# Patient Record
Sex: FEMALE | Race: WHITE | NOT HISPANIC OR LATINO | Employment: FULL TIME | URBAN - METROPOLITAN AREA
[De-identification: names, ages, dates, MRNs, and addresses within clinical notes are randomized per-mention and may not be internally consistent; named-entity substitution may affect disease eponyms.]

---

## 2017-03-10 ENCOUNTER — HOSPITAL ENCOUNTER (EMERGENCY)
Facility: CLINIC | Age: 57
Discharge: HOME | End: 2017-03-10
Attending: EMERGENCY MEDICINE | Admitting: EMERGENCY MEDICINE
Payer: COMMERCIAL

## 2017-03-10 VITALS
WEIGHT: 146 LBS | SYSTOLIC BLOOD PRESSURE: 128 MMHG | BODY MASS INDEX: 23.46 KG/M2 | DIASTOLIC BLOOD PRESSURE: 85 MMHG | RESPIRATION RATE: 15 BRPM | OXYGEN SATURATION: 96 % | HEIGHT: 66 IN | TEMPERATURE: 98.3 F

## 2017-03-10 DIAGNOSIS — R00.2 PALPITATIONS: ICD-10-CM

## 2017-03-10 LAB
ANION GAP SERPL CALCULATED.3IONS-SCNC: 7 MMOL/L (ref 3–14)
BASOPHILS # BLD AUTO: 0 10E9/L (ref 0–0.2)
BASOPHILS NFR BLD AUTO: 0.3 %
BUN SERPL-MCNC: 18 MG/DL (ref 7–30)
CALCIUM SERPL-MCNC: 8.9 MG/DL (ref 8.5–10.1)
CHLORIDE SERPL-SCNC: 103 MMOL/L (ref 94–109)
CO2 SERPL-SCNC: 31 MMOL/L (ref 20–32)
CREAT SERPL-MCNC: 0.9 MG/DL (ref 0.52–1.04)
DIFFERENTIAL METHOD BLD: NORMAL
EOSINOPHIL # BLD AUTO: 0.1 10E9/L (ref 0–0.7)
EOSINOPHIL NFR BLD AUTO: 1.1 %
ERYTHROCYTE [DISTWIDTH] IN BLOOD BY AUTOMATED COUNT: 12 % (ref 10–15)
GFR SERPL CREATININE-BSD FRML MDRD: 64 ML/MIN/1.7M2
GLUCOSE SERPL-MCNC: 103 MG/DL (ref 70–99)
HCT VFR BLD AUTO: 41.5 % (ref 35–47)
HGB BLD-MCNC: 14.2 G/DL (ref 11.7–15.7)
IMM GRANULOCYTES # BLD: 0 10E9/L (ref 0–0.4)
IMM GRANULOCYTES NFR BLD: 0.2 %
INTERPRETATION ECG - MUSE: NORMAL
LYMPHOCYTES # BLD AUTO: 2.2 10E9/L (ref 0.8–5.3)
LYMPHOCYTES NFR BLD AUTO: 35.8 %
MCH RBC QN AUTO: 30.9 PG (ref 26.5–33)
MCHC RBC AUTO-ENTMCNC: 34.2 G/DL (ref 31.5–36.5)
MCV RBC AUTO: 90 FL (ref 78–100)
MONOCYTES # BLD AUTO: 0.3 10E9/L (ref 0–1.3)
MONOCYTES NFR BLD AUTO: 4.3 %
NEUTROPHILS # BLD AUTO: 3.6 10E9/L (ref 1.6–8.3)
NEUTROPHILS NFR BLD AUTO: 58.3 %
NRBC # BLD AUTO: 0 10*3/UL
NRBC BLD AUTO-RTO: 0 /100
PLATELET # BLD AUTO: 234 10E9/L (ref 150–450)
POTASSIUM SERPL-SCNC: 3.6 MMOL/L (ref 3.4–5.3)
RBC # BLD AUTO: 4.59 10E12/L (ref 3.8–5.2)
SODIUM SERPL-SCNC: 141 MMOL/L (ref 133–144)
T4 FREE SERPL-MCNC: 1.01 NG/DL (ref 0.76–1.46)
TROPONIN I SERPL-MCNC: 0.04 UG/L (ref 0–0.04)
TSH SERPL DL<=0.005 MIU/L-ACNC: 5.76 MU/L (ref 0.4–4)
WBC # BLD AUTO: 6.1 10E9/L (ref 4–11)

## 2017-03-10 PROCEDURE — 84484 ASSAY OF TROPONIN QUANT: CPT | Performed by: EMERGENCY MEDICINE

## 2017-03-10 PROCEDURE — 85025 COMPLETE CBC W/AUTO DIFF WBC: CPT | Performed by: EMERGENCY MEDICINE

## 2017-03-10 PROCEDURE — 80048 BASIC METABOLIC PNL TOTAL CA: CPT | Performed by: EMERGENCY MEDICINE

## 2017-03-10 PROCEDURE — 84439 ASSAY OF FREE THYROXINE: CPT | Performed by: EMERGENCY MEDICINE

## 2017-03-10 PROCEDURE — 93005 ELECTROCARDIOGRAM TRACING: CPT

## 2017-03-10 PROCEDURE — 84443 ASSAY THYROID STIM HORMONE: CPT | Performed by: EMERGENCY MEDICINE

## 2017-03-10 PROCEDURE — 99284 EMERGENCY DEPT VISIT MOD MDM: CPT

## 2017-03-10 ASSESSMENT — ENCOUNTER SYMPTOMS
COUGH: 0
SHORTNESS OF BREATH: 0
PALPITATIONS: 1

## 2017-03-10 NOTE — ED AVS SNAPSHOT
Emergency Department    [address] [facility] [address]    Phone:  [phone]    Fax:  [phone]                                       Loretta Pizarro   MRN: [id number]    Department:   Emergency Department   Date of Visit:  3/10/2017           After Visit Summary Signature Page     I have received my discharge instructions, and my questions have been answered. I have discussed any challenges I see with this plan with the nurse or doctor.    ..........................................................................................................................................  Patient/Patient Representative Signature      ..........................................................................................................................................  Patient Representative Print Name and Relationship to Patient    ..................................................               ................................................  Date                                            Time    ..........................................................................................................................................  Reviewed by Signature/Title    ...................................................              ..............................................  Date                                                            Time

## 2017-03-10 NOTE — ED AVS SNAPSHOT
Emergency Department    [address] [facility] [address]    Phone:  [phone]    Fax:  [phone]                                       Loretta Pizarro   MRN: [id number]    Department:   Emergency Department   Date of Visit:  3/10/2017           Patient Information     Date Of Birth          1960        Your diagnoses for this visit were:     Palpitations        You were seen by Emile Zelaya MD.      Follow-up Information     Follow up with Clinic, [facility] In 3 days.    Why:  for recheck to discuss thyroid function tests, and holter monitor    Contact information:    [address] [facility], [address]  [facility] [address]  [phone]          Discharge Instructions         Discharge Instructions  Palpitations    Palpitations are an unusual awareness of your heartbeat. People often describe this as the heart skipping, fluttering, racing, irregular, or pounding. At this time, your doctor has found no signs that your palpitations are due to a serious or life-threatening condition. However, sometimes there is a serious problem that does not show up right away. It is important that you follow up with your doctor within 1 week, or as directed by your doctor today, to check for other serious problems. You may need more blood tests, a stress test, heart monitoring, or other tests.    Palpitations can be caused by caffeine, cigarettes, diet pills, energy drinks or supplements, other stimulants, and medications and street drugs. They can also be caused by anxiety, hormone conditions such as high thyroid, and other medical conditions. Sometimes they are a sign of abnormal rhythm in the heart, so you may need your heart checked.    Return to the Emergency Department if:    You get chest pain or tightness.    You are short of breath.    You get very weak or tired.    You pass out or faint.    Your heart rate is over 120 beats per minute for more than 10  minutes while you are resting.    You have any new symptoms, like fever, cough, numb legs, or you cough up blood.    You have anything else that worries you.    What can I do to help myself?    Fill any prescriptions the doctor gave you and take them right away.     Follow your doctor s instructions about the prescription medicines you are on. Sometimes the doctor may tell you to stop taking a medicine or change the dose.    If you smoke, this may be a good time to quit! The less you can smoke, the better.    Do not use energy drinks, diet pills, or stimulants. Limit your use of caffeine.    Follow up with your doctor:    Within 1 week, or sooner if instructed.    If you keep having palpitations.    If you need help to quit smoking.  If you were given a prescription for medicine here today, be sure to read all of the information (including the package insert) that comes with your prescription.  This will include important information about the medicine, its side effects, and any warnings that you need to know about.  The pharmacist who fills the prescription can provide more information and answer questions you may have about the medicine.  If you have questions or concerns that the pharmacist cannot address, please call or return to the Emergency Department.         Opioid Medication Information    Pain medications are among the most commonly prescribed medicines, so we are including this information for all our patients. If you did not receive pain medication or get a prescription for pain medicine, you can ignore it.     You may have been given a prescription for an opioid (narcotic) pain medicine and/or have received a pain medicine while here in the Emergency Department. These medicines can make you drowsy or impaired. You must not drive, operate dangerous equipment, or engage in any other dangerous activities while taking these medications. If you drive while taking these medications, you could be arrested for  DUI, or driving under the influence. Do not drink any alcohol while you are taking these medications.     Opioid pain medications can cause addiction. If you have a history of chemical dependency of any type, you are at a higher risk of becoming addicted to pain medications.  Only take these prescribed medications to treat your pain when all other options have been tried. Take it for as short a time and as few doses as possible. Store your pain pills in a secure place, as they are frequently stolen and provide a dangerous opportunity for children or visitors in your house to start abusing these powerful medications. We will not replace any lost or stolen medicine.  As soon as your pain is better, you should flush all your remaining medication.     Many prescription pain medications contain Tylenol  (acetaminophen), including Vicodin , Tylenol #3 , Norco , Lortab , and Percocet .  You should not take any extra pills of Tylenol  if you are using these prescription medications or you can get very sick.  Do not ever take more than 3000 mg of acetaminophen in any 24 hour period.    All opioids tend to cause constipation. Drink plenty of water and eat foods that have a lot of fiber, such as fruits, vegetables, prune juice, apple juice and high fiber cereal.  Take a laxative if you don t move your bowels at least every other day. Miralax , Milk of Magnesia, Colace , or Senna  can be used to keep you regular.      Remember that you can always come back to the Emergency Department if you are not able to see your regular doctor in the amount of time listed above, if you get any new symptoms, or if there is anything that worries you.        24 Hour Appointment Hotline       To make an appointment at any [facility], call [phone] ([phone]). If you don't have a family doctor or clinic, we will help you find one. [facility] are conveniently located to serve the needs of you and your family.              Review of your medicines      Notice     You have not been prescribed any medications.            Procedures and tests performed during your visit     Basic metabolic panel    CBC with platelets + differential    EKG 12 lead    T4 free    TSH with free T4 reflex    Troponin I (now)      Orders Needing Specimen Collection     None      Pending Results     No orders found from 3/8/2017 to 3/11/2017.            Pending Culture Results     No orders found from 3/8/2017 to 3/11/2017.             Test Results from your hospital stay     3/10/2017 10:31 PM - Interface, Flexilab Results      Component Results     Component Value Ref Range & Units Status    WBC 6.1 4.0 - 11.0 10e9/L Final    RBC Count 4.59 3.8 - 5.2 10e12/L Final    Hemoglobin 14.2 11.7 - 15.7 g/dL Final    Hematocrit 41.5 35.0 - 47.0 % Final    MCV 90 78 - 100 fl Final    MCH 30.9 26.5 - 33.0 pg Final    MCHC 34.2 31.5 - 36.5 g/dL Final    RDW 12.0 10.0 - 15.0 % Final    Platelet Count 234 150 - 450 10e9/L Final    Diff Method Automated Method  Final    % Neutrophils 58.3 % Final    % Lymphocytes 35.8 % Final    % Monocytes 4.3 % Final    % Eosinophils 1.1 % Final    % Basophils 0.3 % Final    % Immature Granulocytes 0.2 % Final    Nucleated RBCs 0 0 /100 Final    Absolute Neutrophil 3.6 1.6 - 8.3 10e9/L Final    Absolute Lymphocytes 2.2 0.8 - 5.3 10e9/L Final    Absolute Monocytes 0.3 0.0 - 1.3 10e9/L Final    Absolute Eosinophils 0.1 0.0 - 0.7 10e9/L Final    Absolute Basophils 0.0 0.0 - 0.2 10e9/L Final    Abs Immature Granulocytes 0.0 0 - 0.4 10e9/L Final    Absolute Nucleated RBC 0.0  Final         3/10/2017 10:43 PM - Interface, Flexilab Results      Component Results     Component Value Ref Range & Units Status    Sodium 141 133 - 144 mmol/L Final    Potassium 3.6 3.4 - 5.3 mmol/L Final    Chloride 103 94 - 109 mmol/L Final    Carbon Dioxide 31 20 - 32 mmol/L Final    Anion Gap 7 3 - 14 mmol/L Final    Glucose 103 (H) 70 - 99 mg/dL Final    Urea  Nitrogen 18 7 - 30 mg/dL Final    Creatinine 0.90 0.52 - 1.04 mg/dL Final    GFR Estimate 64 >60 mL/min/1.7m2 Final    Non  GFR Calc    GFR Estimate If Black 78 >60 mL/min/1.7m2 Final    African American GFR Calc    Calcium 8.9 8.5 - 10.1 mg/dL Final         3/10/2017 10:53 PM - Interface, Flexilab Results      Component Results     Component Value Ref Range & Units Status    TSH 5.76 (H) 0.40 - 4.00 mU/L Final         3/10/2017 10:48 PM - Interface, Flexilab Results      Component Results     Component Value Ref Range & Units Status    Troponin I ES 0.045 0.000 - 0.045 ug/L Final    The 99th percentile for upper reference range is 0.045 ug/L.  Troponin values   in   the range of 0.045 - 0.120 ug/L may be associated with risks of adverse   clinical events.           3/10/2017 11:06 PM - Interface, Flexilab Results      Component Results     Component Value Ref Range & Units Status    T4 Free 1.01 0.76 - 1.46 ng/dL Final                Clinical Quality Measure: Blood Pressure Screening     Your blood pressure was checked while you were in the emergency department today. The last reading we obtained was  BP: 128/85 . Please read the guidelines below about what these numbers mean and what you should do about them.  If your systolic blood pressure (the top number) is less than 120 and your diastolic blood pressure (the bottom number) is less than 80, then your blood pressure is normal. There is nothing more that you need to do about it.  If your systolic blood pressure (the top number) is 120-139 or your diastolic blood pressure (the bottom number) is 80-89, your blood pressure may be higher than it should be. You should have your blood pressure rechecked within a year by a primary care provider.  If your systolic blood pressure (the top number) is 140 or greater or your diastolic blood pressure (the bottom number) is 90 or greater, you may have high blood pressure. High blood pressure is treatable, but  "if left untreated over time it can put you at risk for heart attack, stroke, or kidney failure. You should have your blood pressure rechecked by a primary care provider within the next 4 weeks.  If your provider in the emergency department today gave you specific instructions to follow-up with your doctor or provider even sooner than that, you should follow that instruction and not wait for up to 4 weeks for your follow-up visit.        Thank you for choosing [facility]       Thank you for choosing [facility] for your care. Our goal is always to provide you with excellent care. Hearing back from our patients is one way we can continue to improve our services. Please take a few minutes to complete the written survey that you may receive in the mail after you visit with us. Thank you!        [facility] Information     [facility] lets you send messages to your doctor, view your test results, renew your prescriptions, schedule appointments and more. To sign up, go to [email or web address] . Click on \"Log in\" on the left side of the screen, which will take you to the Welcome page. Then click on \"Sign up Now\" on the right side of the page.     You will be asked to enter the access code listed below, as well as some personal information. Please follow the directions to create your username and password.     Your access code is: [email or web address]  Expires: 2017 11:09 PM     Your access code will  in 90 days. If you need help or a new code, please call your [facility] clinic or [phone].        Care EveryWhere ID     This is your Care EveryWhere ID. This could be used by other organizations to access your [facility] medical records  [id number]        After Visit Summary       This is your record. Keep this with you and show to your community pharmacist(s) and doctor(s) at your next visit.                  "

## 2017-03-11 NOTE — DISCHARGE INSTRUCTIONS
Discharge Instructions  Palpitations    Palpitations are an unusual awareness of your heartbeat. People often describe this as the heart skipping, fluttering, racing, irregular, or pounding. At this time, your doctor has found no signs that your palpitations are due to a serious or life-threatening condition. However, sometimes there is a serious problem that does not show up right away. It is important that you follow up with your doctor within 1 week, or as directed by your doctor today, to check for other serious problems. You may need more blood tests, a stress test, heart monitoring, or other tests.    Palpitations can be caused by caffeine, cigarettes, diet pills, energy drinks or supplements, other stimulants, and medications and street drugs. They can also be caused by anxiety, hormone conditions such as high thyroid, and other medical conditions. Sometimes they are a sign of abnormal rhythm in the heart, so you may need your heart checked.    Return to the Emergency Department if:    You get chest pain or tightness.    You are short of breath.    You get very weak or tired.    You pass out or faint.    Your heart rate is over 120 beats per minute for more than 10 minutes while you are resting.    You have any new symptoms, like fever, cough, numb legs, or you cough up blood.    You have anything else that worries you.    What can I do to help myself?    Fill any prescriptions the doctor gave you and take them right away.     Follow your doctor s instructions about the prescription medicines you are on. Sometimes the doctor may tell you to stop taking a medicine or change the dose.    If you smoke, this may be a good time to quit! The less you can smoke, the better.    Do not use energy drinks, diet pills, or stimulants. Limit your use of caffeine.    Follow up with your doctor:    Within 1 week, or sooner if instructed.    If you keep having palpitations.    If you need help to quit smoking.  If you were  given a prescription for medicine here today, be sure to read all of the information (including the package insert) that comes with your prescription.  This will include important information about the medicine, its side effects, and any warnings that you need to know about.  The pharmacist who fills the prescription can provide more information and answer questions you may have about the medicine.  If you have questions or concerns that the pharmacist cannot address, please call or return to the Emergency Department.         Opioid Medication Information    Pain medications are among the most commonly prescribed medicines, so we are including this information for all our patients. If you did not receive pain medication or get a prescription for pain medicine, you can ignore it.     You may have been given a prescription for an opioid (narcotic) pain medicine and/or have received a pain medicine while here in the Emergency Department. These medicines can make you drowsy or impaired. You must not drive, operate dangerous equipment, or engage in any other dangerous activities while taking these medications. If you drive while taking these medications, you could be arrested for DUI, or driving under the influence. Do not drink any alcohol while you are taking these medications.     Opioid pain medications can cause addiction. If you have a history of chemical dependency of any type, you are at a higher risk of becoming addicted to pain medications.  Only take these prescribed medications to treat your pain when all other options have been tried. Take it for as short a time and as few doses as possible. Store your pain pills in a secure place, as they are frequently stolen and provide a dangerous opportunity for children or visitors in your house to start abusing these powerful medications. We will not replace any lost or stolen medicine.  As soon as your pain is better, you should flush all your remaining medication.      Many prescription pain medications contain Tylenol  (acetaminophen), including Vicodin , Tylenol #3 , Norco , Lortab , and Percocet .  You should not take any extra pills of Tylenol  if you are using these prescription medications or you can get very sick.  Do not ever take more than 3000 mg of acetaminophen in any 24 hour period.    All opioids tend to cause constipation. Drink plenty of water and eat foods that have a lot of fiber, such as fruits, vegetables, prune juice, apple juice and high fiber cereal.  Take a laxative if you don t move your bowels at least every other day. Miralax , Milk of Magnesia, Colace , or Senna  can be used to keep you regular.      Remember that you can always come back to the Emergency Department if you are not able to see your regular doctor in the amount of time listed above, if you get any new symptoms, or if there is anything that worries you.

## 2017-03-11 NOTE — ED PROVIDER NOTES
"  History     Chief Complaint:  Palpitations     HPI   Loretta Pizarro is an otherwise healthy 56 year old female who presents to the emergency department today for evaluation of palpitations for the past week. The patient states that she has been waking up in the middle of the night with a \"pounding heart.\" The patient states that during these episodes, her pulse is within a normal range and she does not feel like her heart is racing. The patient states that her episode today began at 2030 this evening after dinner and waned as she was en route to the ED. The patient's palpitations are not exacerbated by positions or movements. The patient describes a \"tense feeling\" at the sternum but denies chest pain. The patient denies shortness of breath and cough. The patient denies leg swelling.     Cardiac/PE/DVT Risk Factors:  History of hypertension - negative  History of hyperlipidemia - negative  History of diabetes - negative  History of smoking - negative  Personal history of PE/DVT - negative  Family history of PE/DVT - negative  Family history of heart complications - negative  Recent travel - negative  Recent surgery - negative  Other immobilizations - negative  Cancer - negative      Allergies:  The patient has no known allergies to medications.      Medications:    The patient is not currently taking any prescribed medications.     Past Medical History:    The patient does not have any pertinent past medical history.     Past Surgical History:    The patient has no pertinent surgical history.     Family History:  The patient has no pertinent family history.    Social History:  The patient was accompanied to the ED by .  Smoking Status: never  Alcohol Use: positive  Marital Status:   [2]     Review of Systems   Respiratory: Negative for cough and shortness of breath.    Cardiovascular: Positive for palpitations. Negative for chest pain and leg swelling.   All other systems reviewed and are " "negative.    Physical Exam   Vitals:  Patient Vitals for the past 24 hrs:   BP Temp Temp src Heart Rate Resp SpO2 Height Weight   03/10/17 2300 128/85 - - 56 15 96 % - -   03/10/17 2245 117/73 - - 53 18 97 % - -   03/10/17 2230 114/75 - - - - - - -   03/10/17 2229 - - - 53 16 98 % - -   03/10/17 2219 109/71 - - 49 13 97 % - -   03/10/17 2126 146/71 98.3  F (36.8  C) Oral 64 16 99 % - -   03/10/17 2123 - - - - - - 1.676 m (5' 6\") 66.2 kg (146 lb)     Physical Exam   Constitutional: She is oriented to person, place, and time. She appears well-developed.   HENT:   Head: Normocephalic and atraumatic.   Right Ear: External ear normal.   Mouth/Throat: Oropharynx is clear and moist.   Eyes: Conjunctivae and EOM are normal. Pupils are equal, round, and reactive to light.   Neck: Normal range of motion. Neck supple. No JVD present.   Cardiovascular: Normal rate, regular rhythm and normal heart sounds.    Pulmonary/Chest: Effort normal and breath sounds normal.   Abdominal: Soft. Bowel sounds are normal. She exhibits no distension. There is no tenderness. There is no rebound.   Musculoskeletal: Normal range of motion.   Lymphadenopathy:     She has no cervical adenopathy.   Neurological: She is alert and oriented to person, place, and time. She displays normal reflexes. No cranial nerve deficit. She exhibits normal muscle tone. Coordination normal.   Skin: Skin is warm and dry.   Psychiatric: She has a normal mood and affect. Her behavior is normal. Judgment normal.   Nursing note and vitals reviewed.    Emergency Department Course     ECG:  ECG taken at 2130, ECG read at 2130  Normal sinus rhythm   Possible left atrial enlargement   T wave abnormality, consider anterior ischemia   Abnormal ECG  Rate 62 bpm. ID interval 120. QRS duration 90. QT/QTc 406/412. P-R-T axes 68 66 21.      Laboratory:  Laboratory findings were communicated with the patient who voiced understanding of the findings.    CBC: AWNL. (WBC 6.1, HGB 14.2, " )   BMP: Glucose 103 (H) o/w WNL (Creatinine 0.90)  TSH with free T4 reflex: 5.76 (H)  Troponin (Collected 2215): 0.045  T4 Free: 1.01       Emergency Department Course:  Nursing notes and vitals reviewed.  I performed an exam of the patient as documented above.   2130: EKG obtained in the ED, see results above.  IV was inserted and blood was drawn for laboratory testing, results above.   At 1103 the patient was rechecked and was updated on the results of her laboratory studies.   I discussed the treatment plan with the patient. They expressed understanding of this plan and consented to discharge. They will be discharged home with instructions for care and follow up. In addition, the patient will return to the emergency department if their symptoms persist, worsen, if new symptoms arise or if there is any concern.  All questions were answered.  I personally reviewed the laboratory results with the Patient and answered all related questions prior to discharge.    Impression & Plan      Medical Decision Making:  Loretta Pizarro is a 56 year old female who presents to the emergency department today for evaluation of palpitations and irregular pulse at home. Symptoms have resolved and the patient s EKG does show diffuse T wave inversions. This is unchanged from her prior EKG. Troponin and laboratory tests are normal though TSH is reported at somewhat high. The patient is advised of this finding and I recommended follow up with primary care. I doubt hyperthyroidism due to normal heart rate and normal blood pressure and there are no signs of exophthalmos or other concerns. However, I would recommend follow up to discuss Holter monitor for further evaluation with her primary care.       Diagnosis:    ICD-10-CM    1. Palpitations R00.2    2. Elevated TSH of unclear etiology.      Disposition:   The patient is discharged home.       Scribe Disclosure:  Tatyana DOHERTY, am serving as a scribe at 10:01 PM on  3/10/2017 to document services personally performed by Emile Zelaya MD, based on my observations and the provider's statements to me.    3/10/2017    EMERGENCY DEPARTMENT       Emile Zelaya MD  03/13/17 0344

## 2019-05-21 ENCOUNTER — HOSPITAL ENCOUNTER (OUTPATIENT)
Facility: CLINIC | Age: 59
Setting detail: OBSERVATION
Discharge: HOME | End: 2019-05-22
Attending: EMERGENCY MEDICINE | Admitting: INTERNAL MEDICINE
Payer: COMMERCIAL

## 2019-05-21 ENCOUNTER — APPOINTMENT (OUTPATIENT)
Dept: GENERAL RADIOLOGY | Facility: CLINIC | Age: 59
End: 2019-05-21
Attending: EMERGENCY MEDICINE
Payer: COMMERCIAL

## 2019-05-21 DIAGNOSIS — R94.31 ABNORMAL ELECTROCARDIOGRAM: Primary | ICD-10-CM

## 2019-05-21 DIAGNOSIS — R07.9 CHEST PAIN: ICD-10-CM

## 2019-05-21 LAB
ANION GAP SERPL CALCULATED.3IONS-SCNC: 7 MMOL/L (ref 3–14)
BASOPHILS # BLD AUTO: 0 10E9/L (ref 0–0.2)
BASOPHILS NFR BLD AUTO: 0.2 %
BUN SERPL-MCNC: 12 MG/DL (ref 7–30)
CALCIUM SERPL-MCNC: 8.3 MG/DL (ref 8.5–10.1)
CHLORIDE SERPL-SCNC: 110 MMOL/L (ref 94–109)
CO2 SERPL-SCNC: 28 MMOL/L (ref 20–32)
CREAT SERPL-MCNC: 0.78 MG/DL (ref 0.52–1.04)
DIFFERENTIAL METHOD BLD: NORMAL
EOSINOPHIL # BLD AUTO: 0 10E9/L (ref 0–0.7)
EOSINOPHIL NFR BLD AUTO: 0.7 %
ERYTHROCYTE [DISTWIDTH] IN BLOOD BY AUTOMATED COUNT: 12.5 % (ref 10–15)
GFR SERPL CREATININE-BSD FRML MDRD: 82 ML/MIN/{1.73_M2}
GLUCOSE SERPL-MCNC: 79 MG/DL (ref 70–99)
HCT VFR BLD AUTO: 40.9 % (ref 35–47)
HGB BLD-MCNC: 13.6 G/DL (ref 11.7–15.7)
IMM GRANULOCYTES # BLD: 0 10E9/L (ref 0–0.4)
IMM GRANULOCYTES NFR BLD: 0.2 %
INTERPRETATION ECG - MUSE: NORMAL
LYMPHOCYTES # BLD AUTO: 1.7 10E9/L (ref 0.8–5.3)
LYMPHOCYTES NFR BLD AUTO: 28.1 %
MCH RBC QN AUTO: 30.7 PG (ref 26.5–33)
MCHC RBC AUTO-ENTMCNC: 33.3 G/DL (ref 31.5–36.5)
MCV RBC AUTO: 92 FL (ref 78–100)
MONOCYTES # BLD AUTO: 0.4 10E9/L (ref 0–1.3)
MONOCYTES NFR BLD AUTO: 6.7 %
NEUTROPHILS # BLD AUTO: 3.9 10E9/L (ref 1.6–8.3)
NEUTROPHILS NFR BLD AUTO: 64.1 %
NRBC # BLD AUTO: 0 10*3/UL
NRBC BLD AUTO-RTO: 0 /100
PLATELET # BLD AUTO: 236 10E9/L (ref 150–450)
POTASSIUM SERPL-SCNC: 3.4 MMOL/L (ref 3.4–5.3)
RBC # BLD AUTO: 4.43 10E12/L (ref 3.8–5.2)
SODIUM SERPL-SCNC: 145 MMOL/L (ref 133–144)
TROPONIN I SERPL-MCNC: 0.05 UG/L (ref 0–0.04)
TROPONIN I SERPL-MCNC: 0.05 UG/L (ref 0–0.04)
WBC # BLD AUTO: 6.1 10E9/L (ref 4–11)

## 2019-05-21 PROCEDURE — G0378 HOSPITAL OBSERVATION PER HR: HCPCS

## 2019-05-21 PROCEDURE — 71046 X-RAY EXAM CHEST 2 VIEWS: CPT

## 2019-05-21 PROCEDURE — 99219 ZZC INITIAL OBSERVATION CARE,LEVL II: CPT | Performed by: INTERNAL MEDICINE

## 2019-05-21 PROCEDURE — 36415 COLL VENOUS BLD VENIPUNCTURE: CPT | Performed by: INTERNAL MEDICINE

## 2019-05-21 PROCEDURE — 25000132 ZZH RX MED GY IP 250 OP 250 PS 637: Performed by: EMERGENCY MEDICINE

## 2019-05-21 PROCEDURE — 99285 EMERGENCY DEPT VISIT HI MDM: CPT

## 2019-05-21 PROCEDURE — 84484 ASSAY OF TROPONIN QUANT: CPT | Performed by: INTERNAL MEDICINE

## 2019-05-21 PROCEDURE — 85025 COMPLETE CBC W/AUTO DIFF WBC: CPT | Performed by: EMERGENCY MEDICINE

## 2019-05-21 PROCEDURE — 93005 ELECTROCARDIOGRAM TRACING: CPT

## 2019-05-21 PROCEDURE — 84484 ASSAY OF TROPONIN QUANT: CPT | Performed by: EMERGENCY MEDICINE

## 2019-05-21 PROCEDURE — 80048 BASIC METABOLIC PNL TOTAL CA: CPT | Performed by: EMERGENCY MEDICINE

## 2019-05-21 RX ORDER — NALOXONE HYDROCHLORIDE 0.4 MG/ML
.1-.4 INJECTION, SOLUTION INTRAMUSCULAR; INTRAVENOUS; SUBCUTANEOUS
Status: DISCONTINUED | OUTPATIENT
Start: 2019-05-21 | End: 2019-05-22 | Stop reason: HOSPADM

## 2019-05-21 RX ORDER — IBUPROFEN 600 MG/1
600 TABLET, FILM COATED ORAL EVERY 6 HOURS PRN
Status: DISCONTINUED | OUTPATIENT
Start: 2019-05-21 | End: 2019-05-22 | Stop reason: HOSPADM

## 2019-05-21 RX ORDER — ACETAMINOPHEN 325 MG/1
650 TABLET ORAL EVERY 4 HOURS PRN
Status: DISCONTINUED | OUTPATIENT
Start: 2019-05-21 | End: 2019-05-22 | Stop reason: HOSPADM

## 2019-05-21 RX ORDER — ONDANSETRON 4 MG/1
4 TABLET, ORALLY DISINTEGRATING ORAL EVERY 6 HOURS PRN
Status: DISCONTINUED | OUTPATIENT
Start: 2019-05-21 | End: 2019-05-22 | Stop reason: HOSPADM

## 2019-05-21 RX ORDER — ONDANSETRON 2 MG/ML
4 INJECTION INTRAMUSCULAR; INTRAVENOUS EVERY 6 HOURS PRN
Status: DISCONTINUED | OUTPATIENT
Start: 2019-05-21 | End: 2019-05-22 | Stop reason: HOSPADM

## 2019-05-21 RX ORDER — ASPIRIN 81 MG/1
162 TABLET, CHEWABLE ORAL ONCE
Status: COMPLETED | OUTPATIENT
Start: 2019-05-21 | End: 2019-05-21

## 2019-05-21 RX ADMIN — ASPIRIN 81 MG 162 MG: 81 TABLET ORAL at 18:02

## 2019-05-21 ASSESSMENT — ENCOUNTER SYMPTOMS: ABDOMINAL PAIN: 0

## 2019-05-21 ASSESSMENT — MIFFLIN-ST. JEOR: SCORE: 1258.07

## 2019-05-21 NOTE — ED PROVIDER NOTES
"  History     Chief Complaint:  Chest pain     HPI   Loretta Pizarro is a 59 year old female who presents with chest pain. The patient has had sharp chest pain in her right upper chest intermittently for the past week. She has had 7-10 episodes total and they last 30-40 minutes at a time. She has also noticed associated tingling in her left arm. The patient saw her primary care physician today and had an ECG done which concerned her physician. Due to concern, the patient has visited the ED for evaluation and treatment. Upon arrival, the patient states she has been running this week so the pain is not exertional. She runs 5-7 days a week. There is no pain here in the ED. The patient has not had any of these pains prior to one week ago. No recent stress testing. The patient denies any abdominal pain or SOB.    Allergies:  The patient has no known drug allergies.    Medications:    The patient is currently on no regular medications.     Past Medical History:    The patient denies any significant past medical history.    Past Surgical History:    The patient does not have any pertinent past surgical history.    Family History:    No past pertinent family history.    Social History:  Marital Status:     Smoker:   Never  Alcohol:   Yes, occasional   Drugs:   No     Review of Systems   Constitutional: Negative for fever.   Respiratory: Negative for shortness of breath.    Cardiovascular: Positive for chest pain.   Gastrointestinal: Negative for abdominal pain.   All other systems reviewed and are negative.    Physical Exam     Patient Vitals for the past 24 hrs:   BP Temp Temp src Pulse Heart Rate Resp SpO2 Height Weight   05/21/19 1900 120/83 -- -- (!) 49 50 13 98 % -- --   05/21/19 1849 -- -- -- -- 51 16 97 % -- --   05/21/19 1800 124/68 -- -- (!) 49 (!) 48 17 100 % -- --   05/21/19 1730 144/72 97.8  F (36.6  C) Oral 51 -- 16 100 % 1.676 m (5' 6\") 67.1 kg (148 lb)     Physical Exam  General: Alert and " cooperative with exam. Patient in mild distress. Normal mentation.  Head:  Scalp is NC/AT  Eyes:  No scleral icterus, PERRL  ENT:  The external nose and ears are normal. The oropharynx is normal and without erythema; mucus membranes are moist. Uvula midline, no evidence of deep space infection.  Neck:  Normal range of motion without rigidity.  CV:  Bradycardicrate and reg rhythm    No pathologic murmur   Resp:  Breath sounds are clear bilaterally    Non-labored, no retractions or accessory muscle use  GI:  Abdomen is soft, no distension, no tenderness. No peritoneal signs  MS:  No lower extremity edema   Skin:  Warm and dry, No rash or lesions noted.  Neuro: Oriented x 3. No gross motor deficits.    Emergency Department Course   ECG:  Indication: chest pain  Time: 1733  Vent. Rate 51 bpm. NM interval 118. QRS duration 100. QT/QTc 438/403. P-R-T axis 67 68 -22. Sinus bradycardia with sinus arrhythmia. Possible left atrial enlargement. T wave abnormality, consider inferior ischemia. T wave abnormality, consider anterior ischemia. Abnormal ECG. Read time: 1739    Imaging:  Radiographic findings were communicated with the patient who voiced understanding of the findings.    XR Chest PA & LAT:   No acute abnormality. as per radiology.     Laboratory:  1745 Troponin: 0.046  BMP: sodium 145, chloride 110, calcium 8.3, o/w WNL (Creatinine: 0.78)  CBC: WBC: 6.1, HGB: 13.6, PLT: 236    Interventions:  1802: Aspirin 162 mg PO    Emergency Department Course:  1744 I performed an exam of the patient as documented above.   1845 I rechecked the patient and discussed the results of their workup thus far.   (1916)  I consulted with Dr. Whitley  of the hospitalist services. They are in agreement to accept the patient for admission.    Findings and plan explained to the Patient who consents to admission. Discussed the patient with Dr. Whitley, who will admit the patient to a OBS bed for further monitoring, evaluation, and  treatment.    Impression & Plan    Medical Decision Making:  Patient is a 59-year-old female who presents with intermittent chest pain over the last 1 week.  Patient's medical history and records were reviewed.  Initial consideration for, but not limited to, musculoskeletal pain, ACS/MI, angina/unstable angina, infectious process, pneumothorax, among others.  Labs, EKG, imaging was obtained.  Chest x-ray unremarkable.  Patient asymptomatic at time of presentation.  EKG demonstrates diffuse T wave inversions without significant change as compared to previous.  Patient's symptoms are somewhat atypical for ACS as she notes normal exercise tolerance and symptoms are not induced by exertion.  Labs notable for minimally elevated troponin.  She was provided 162 mg aspirin.  As atypical ACS/cardiac etiology not excluded, I will admit to chest pain observation for further evaluation and care.  Patient remained asymptomatic throughout ED course.  I did consider PE however unlikely given patient's history and clinical presentation.    Diagnosis:    ICD-10-CM    1. Chest pain R07.9 Troponin I     Disposition:  Admitted to OBS    Scribe Disclosure:  I, Saurabh Pina, am serving as a scribe on 5/21/2019 at 5:44 PM to personally document services performed by Krystian Bhatia DO based on my observations and the provider's statements to me.     Saurabh Pina  5/21/2019    EMERGENCY DEPARTMENT       Krystian Bhatia DO  05/22/19 0119

## 2019-05-22 VITALS
HEIGHT: 66 IN | DIASTOLIC BLOOD PRESSURE: 52 MMHG | RESPIRATION RATE: 16 BRPM | HEART RATE: 54 BPM | OXYGEN SATURATION: 96 % | TEMPERATURE: 98.4 F | WEIGHT: 148 LBS | SYSTOLIC BLOOD PRESSURE: 115 MMHG | BODY MASS INDEX: 23.78 KG/M2

## 2019-05-22 LAB — TROPONIN I SERPL-MCNC: 0.04 UG/L (ref 0–0.04)

## 2019-05-22 PROCEDURE — 99217 ZZC OBSERVATION CARE DISCHARGE: CPT | Performed by: PHYSICIAN ASSISTANT

## 2019-05-22 PROCEDURE — G0378 HOSPITAL OBSERVATION PER HR: HCPCS

## 2019-05-22 PROCEDURE — 84484 ASSAY OF TROPONIN QUANT: CPT | Performed by: INTERNAL MEDICINE

## 2019-05-22 PROCEDURE — 36415 COLL VENOUS BLD VENIPUNCTURE: CPT | Performed by: INTERNAL MEDICINE

## 2019-05-22 ASSESSMENT — ENCOUNTER SYMPTOMS
FEVER: 0
SHORTNESS OF BREATH: 0

## 2019-05-22 NOTE — PLAN OF CARE
A&Ox4. VSS. IND. Denies chest pain. Tele SB in 50s. Cardiology consult cancelled per PA request. Pt cleared to discharge this AM. Discharge instructions were provided and explained to the patient in detail. All questions were answered. No changes were made to her medications. IV and tele removed prior to discharge. Pt discharged this AM independently.

## 2019-05-22 NOTE — PROGRESS NOTES
RECEIVING UNIT ED HANDOFF REVIEW    ED Nurse Handoff Report was reviewed by: Sarah Abbasi on May 21, 2019 at 7:35 PM

## 2019-05-22 NOTE — DISCHARGE SUMMARY
[facility]  Hospitalist Discharge Summary       Date of Admission:  2019  Date of Discharge:  2019  Discharging Provider: Angie Escobar PA-C      Discharge Diagnoses   Atypical Chest Pain  Chronically Elevated Troponin  Abnormal EKG    Follow-ups Needed After Discharge   Follow-up Appointments     Follow-up and recommended labs and tests       1. Follow up with [facility]. Their office will call to arrange follow up.               Unresulted Labs Ordered in the Past 30 Days of this Admission     No orders found for last 61 day(s).        Discharge Disposition   Discharged to home  Condition at discharge: Stable    Hospital Course   HPI from H&P per Dr. Zach MD:    59 year old female who presents with chest pain with borderline elevated trop, and baseline abnormal EKG who had a work up for chest pain in  which showed a Chest CT coronary angiogram with no CAD and calcium score of Zero, and negative nuclear stress test.  Now, since 1 week ago, has noticed episodic chest pain -- first episode she notice while lying in bed on Tues, , reported sharp pain (a hot spot) she localized over left lateral chest, not pleuritic, stayed about 40 min, thought it was a muscle pain -- possibly related to lifting weights.  Since then she has had this same pain 6 to 10 times, and also had tingling in left forearm, and tingling in index finger.  She did have her son graduate from [employer] this week, and join the , and attended a  -- all stressful but not overwhelming, and she has continued to run 4 to 6 miles a day (5 out of 7 days) and never had chest pain with running.       Today she called her clinic to see what they thought, and was told to go to the ER, and there her trop was 0.046, and EKG with sinus makeda, rate 51, and T wave inverted in V3-4, and biphasic in V5, and T wave inverted inferiorly -- similar to EKG on 3/10/17.        Atypical Chest  Pain  Chronically Elevated Troponin  Abnormal EKG: History of abnormal EKG with an inverted T waves dating back many years.  Previously saw cardiology in 2014 for evaluation due to insurance issues.  Had stress MRI and echocardiogram that was unremarkable.  Historically asymptomatic.  Presented with atypical chest pain.  Sharp and located under her left armpit.  Reproducible with palpation.  Occasionally associated with some left arm tingliness.  Definitely not exertional.  Continues to run 5 to 6 miles 4 to 5 days a week without worsening pain.  Troponin was elevated at 0.4 which appears chronic.  EKG was sinus bradycardia and inverted T waves in V3-5 which is chronic.  -Admitted to observation.  Serial troponins were obtained and were flat.  Telemetry showed sinus bradycardia with no significant changes.  No worsening of chest pain.  Continued to have reproducible chest pain under left armpit.  -Discussed with patient overall not concerning for cardiac etiology given the reproducible component and the fact that it is not exertional.  She would like to get cardiology's opinion but agrees that this can be done as outpatient.  Will refer back to Dr. Gusman at [facility] [place] for further recommendations.    Angie Escobar PA-C  [facility]  ______________________________________________________________________    Physical Exam   Vital Signs: Temp: 98.4  F (36.9  C) Temp src: Oral BP: 115/52 Pulse: 54 Heart Rate: 54 Resp: 16 SpO2: 96 % O2 Device: None (Room air)    Weight: 148 lbs 0 oz  Constitutional: Alert, resting comfortably in NAD  Respiratory: Normal effort, symmetric expansion, no crackles or wheezing  Cardiovascular: RRR no murmurs. Chest wall pain tenderness in left axilla.   GI: Non distended, normal bowels sounds, no tenderness or guarding  MSK: LE without edema. Dorsalis pedis pulse palpated bilaterally.   Skin/Integumen: Clear  Neuro: CN II-XII grossly  intact  Psych:  Alert and oriented x 3. Normal affect         Primary Care Physician   [facility]    Discharge Orders      Follow-Up with Cardiologist      Follow-up and recommended labs and tests     1. Follow up with [facility]. Their office will call to arrange follow up.     Activity    Your activity upon discharge: activity as tolerated     Reason for your hospital stay    You were admitted for evaluation of chest pain. You were monitored and your cardiac enzymes were stable. I do not suspect this is cardiac in origin. I will refer you back to Dr. Gusman at [facility] to discuss if further work-up is indicated     Diet    Follow this diet upon discharge: Orders Placed This Encounter      Regular diet       Significant Results and Procedures   Most Recent 3 BMP's:  Recent Labs   Lab Test 05/21/19  1745 03/10/17  2215   * 141   POTASSIUM 3.4 3.6   CHLORIDE 110* 103   CO2 28 31   BUN 12 18   CR 0.78 0.90   ANIONGAP 7 7   JUAN RAMON 8.3* 8.9   GLC 79 103*     Most Recent 3 Troponin's:  Recent Labs   Lab Test 05/22/19  0629 05/21/19  2321 05/21/19  1745   TROPI 0.043 0.049* 0.046*   ,   Results for orders placed or performed during the hospital encounter of 05/21/19   Chest XR,  PA & LAT    Narrative    CHEST TWO VIEW   5/21/2019 6:07 PM     HISTORY: Chest pain.    COMPARISON: None.    FINDINGS: The heart size is normal. The lungs are clear. No  pneumothorax or pleural effusion.      Impression    IMPRESSION: No acute abnormality.       Discharge Medications   There are no discharge medications for this patient.    Allergies   No Known Allergies

## 2019-05-22 NOTE — PLAN OF CARE
PRIMARY DIAGNOSIS: CHEST PAIN  OUTPATIENT/OBSERVATION GOALS TO BE MET BEFORE DISCHARGE:  1. Ruled out acute coronary syndrome (negative or stable Troponin):  No  2. Pain Status: Pain free.  3. Appropriate provocative testing performed: No  - Stress Test Procedure: NO  - Interpretation of cardiac rhythm per telemetry tech: SB    4. Cleared by Consultants (if applicable):No  5. Return to near baseline physical activity: Yes  Discharge Planner Nurse   Safe discharge environment identified: Yes  Barriers to discharge: Yes       Entered by: Sarah Abbasi 05/21/2019 10:44 PM     Please review provider order for any additional goals.   Nurse to notify provider when observation goals have been met and patient is ready for discharge.

## 2019-05-22 NOTE — PROGRESS NOTES
A&Ox4. VSS except bradycardic on RA. Denies chest pain or SOB, no c/o tingling in her fingers. NPO at midnight. Tele is SB. Troponin 1x and 2x elevated, blood draws scheduled. MD is aware of elevated trop. Will continue to monitor. Cardiology consult scheduled.

## 2019-05-22 NOTE — PLAN OF CARE
PRIMARY DIAGNOSIS: CHEST PAIN  OUTPATIENT/OBSERVATION GOALS TO BE MET BEFORE DISCHARGE:  1. Ruled out acute coronary syndrome (negative or stable Troponin):  No  2. Pain Status: Pain free.  3. Appropriate provocative testing performed: No  - Stress Test Procedure: awaiting cardiology consult    - Interpretation of cardiac rhythm per telemetry tech: Sinus Geo     4. Cleared by Consultants (if applicable):No  5. Return to near baseline physical activity: Yes  Discharge Planner Nurse   Safe discharge environment identified: Yes  Barriers to discharge: Yes, consults       Entered by: Akua Nixon 05/22/2019 8:19 AM     Please review provider order for any additional goals.   Nurse to notify provider when observation goals have been met and patient is ready for discharge.

## 2019-05-22 NOTE — PHARMACY-ADMISSION MEDICATION HISTORY
Admission medication history interview status for the 5/21/2019  admission is complete. See EPIC admission navigator for prior to admission medications     Medication history source reliability:Good    Actions taken by pharmacist (provider contacted, etc): Interviewed patient.      Additional medication history information not noted on PTA med list :None    Medication reconciliation/reorder completed by provider prior to medication history? No    Time spent in this activity: 1 min    Prior to Admission medications    Not on File       Magaly Mahan PharmD, BCPS

## 2019-05-22 NOTE — PLAN OF CARE
PRIMARY DIAGNOSIS: CHEST PAIN  OUTPATIENT/OBSERVATION GOALS TO BE MET BEFORE DISCHARGE:  1. Ruled out acute coronary syndrome (negative or stable Troponin):  No, still in process  2. Pain Status: Pain free.  3. Appropriate provocative testing performed: No  - Stress Test Procedure: NO  - Interpretation of cardiac rhythm per telemetry tech: SB     4. Cleared by Consultants (if applicable):No  5. Return to near baseline physical activity: Yes  Discharge Planner Nurse   Safe discharge environment identified: Yes  Barriers to discharge: Yes       Entered by: Amparo Barillas RN on 5/22/2019 at 2:57 AM  Please review provider order for any additional goals.   Nurse to notify provider when observation goals have been met and patient is ready for discharge.

## 2019-05-22 NOTE — H&P
[facility]    History and Physical  Hospitalist       Date of Admission:  2019    Assessment & Plan   Loretta Pizarro is a healthy 59 year old female who presents with chest pain with borderline elevated trop, and baseline abnormal EKG:    Summary:    Active Problems:    Chest pain -- clinically consistent with chest wall pain.  Has point tenderness over left lateral pectoralis muscle which reproduces the pain.  Suspect stress this week from her only son graduating college and entering the , plus attending a , may have aggravated chest wall pain.     -- serial trops   -- could get stress echo in AM, or follow up with cardiology.  She sees Dr. Gusman and would like to see what he says (she is not worried, but wants to do what ever cardiology suggests).     -- NPO after Midnight in case cardiology wants to do any testing       Bradycardia -- asymptomatic, related to excellent physical conditioning       Borderline elevated Trop   -- her baseline trop appears slightly elevated (?related to running)   -- trop in  was 0.045 then (similar to now)       Abnormal EKG -- baseline for patient       Arm tingling -- ?cause, appears insignificant   -- suggest EMG in 2 weeks if still there and if it becomes an issue       Raynaud's Phenomenon -- incidental    -- not related to this        Plan:  Admit to observation, serial trops, then get opinion from cardiology as to whether to pursue any further testing.  She has seen Dr. Gusman with cardiology and would like to get his input (or his colleagues input).      DVT Prophylaxis: Low Risk/Ambulatory with no VTE prophylaxis indicated  Code Status: Full Code    Disposition: Expected discharge tomorrow    Jeramy Whitley MD  Pager: [phone]  Cell Phone:  [phone]       Primary Care Physician   [facility]    Chief Complaint   Chest pain    History is obtained from Patient    History of Present Illness     59 year old female who presents with chest pain with borderline elevated trop, and baseline abnormal EKG who had a work up for chest pain in  which showed a Chest CT coronary angiogram with no CAD and calcium score of Zero, and negative nuclear stress test.  Now, since 1 week ago, has noticed episodic chest pain -- first episode she notice while lying in bed on Tues, , reported sharp pain (a hot spot) she localized over left lateral chest, not pleuritic, stayed about 40 min, thought it was a muscle pain -- possibly related to lifting weights.  Since then she has had this same pain 6 to 10 times, and also had tingling in left forearm, and tingling in index finger.  She did have her son graduate from [employer] this week, and join the , and attended a  -- all stressful but not overwhelming, and she has continued to run 4 to 6 miles a day (5 out of 7 days) and never had chest pain with running.      Today she called her clinic to see what they thought, and was told to go to the ER, and there her trop was 0.046, and EKG with sinus makeda, rate 51, and T wave inverted in V3-4, and biphasic in V5, and T wave inverted inferiorly -- similar to EKG on 3/10/17.      Past Medical History    I have reviewed this patient's medical history and updated it with pertinent information if needed.   Past Medical History:   Diagnosis Date     Chest pain     Normal CTA and normal nuclear stress test       Past Surgical History   I have reviewed this patient's surgical history and updated it with pertinent information if needed.  History reviewed. No pertinent surgical history.    Prior to Admission Medications   None     Allergies   No Known Allergies    Social History   I have reviewed this patient's social history and updated it with pertinent information if needed. Loretta CHATA Pizarro  reports that she has never smoked. She does not have any smokeless tobacco history on file. She reports that  she drinks alcohol. She reports that she does not use drugs.    Family History   I have reviewed this patient's family history and updated it with pertinent information if needed.   Family History   Problem Relation Age of Onset     Hypertension Mother      Diabetes Type 2  Mother      Cerebrovascular Disease Father         Vascular dementia       Review of Systems   The 10 point Review of Systems is negative other than noted in the HPI or here.     # Pain Assessment:  Current Pain Score 5/21/2019   Pain score (0-10) 0   Loretta main pain level was assessed and she currently denies pain.        Physical Exam   Temp: 97.8  F (36.6  C) Temp src: Oral BP: 136/65 Pulse: 54 Heart Rate: 52 Resp: 16 SpO2: 98 % O2 Device: None (Room air)    Vital Signs with Ranges  Temp:  [97.8  F (36.6  C)] 97.8  F (36.6  C)  Pulse:  [49-54] 54  Heart Rate:  [48-52] 52  Resp:  [13-17] 16  BP: (120-144)/(65-83) 136/65  SpO2:  [97 %-100 %] 98 %  148 lbs 0 oz    Constitutional: Awake, alert, cooperative, no apparent distress.  Eyes: Conjunctiva and pupils examined and normal.  HEENT: Moist mucous membranes, normal dentition.  Respiratory: Clear to auscultation bilaterally, no crackles or wheezing.  Cardiovascular: Regular rate and rhythm, normal S1 and S2, and no murmur noted, no carotid bruits.  No ankle edema.   Chest:  Slight tenderness over left lateral chest wall which reproduces the pain.  No tenderness over costal sternal junction.   GI: Soft, non-distended, non-tender, normal bowel sounds.  Lymph/Hematologic: No anterior cervical, supraclavicular or axillary adenopathy.  Skin: No rashes, no cyanosis.   Musculoskeletal: No joint swelling, erythema or tenderness.  Neurologic: Cranial nerves 2-12 intact, no focal weakness or numbness  Psychiatric: Alert, Ox3, no obvious anxiety or depression.    Data   Data reviewed today:  I personally reviewed the EKG tracing showing as above.  Recent Labs   Lab 05/21/19  1745   WBC 6.1   HGB 13.6   MCV 92       *   POTASSIUM 3.4   CHLORIDE 110*   CO2 28   BUN 12   CR 0.78   ANIONGAP 7   JUAN RAMON 8.3*   GLC 79   TROPI 0.046*       Imaging:  Recent Results (from the past 24 hour(s))   Chest XR,  PA & LAT    Narrative    CHEST TWO VIEW   5/21/2019 6:07 PM     HISTORY: Chest pain.    COMPARISON: None.    FINDINGS: The heart size is normal. The lungs are clear. No  pneumothorax or pleural effusion.      Impression    IMPRESSION: No acute abnormality.

## 2019-05-22 NOTE — ED NOTES
"[facility]  ED Nurse Handoff Report    ED Chief complaint: Chest Pain (chest pain off andon for 2 weeks does not have it when running)      ED Diagnosis:   Final diagnoses:   Chest pain       Code Status: Full Code    Allergies: No Known Allergies    Activity level - Baseline/Home:  Independent    Activity Level - Current:   Independent     Needed?: No    Isolation: No  Infection: Not Applicable  Bariatric?: No    Vital Signs:   Vitals:    05/21/19 1730 05/21/19 1800 05/21/19 1849 05/21/19 1900   BP: 144/72 124/68  120/83   Pulse: 51 (!) 49  (!) 49   Resp: 16 17 16 13   Temp: 97.8  F (36.6  C)      TempSrc: Oral      SpO2: 100% 100% 97% 98%   Weight: 67.1 kg (148 lb)      Height: 1.676 m (5' 6\")          Cardiac Rhythm: ,   Cardiac  Cardiac Rhythm: Sinus bradycardia    Pain level: 0-10 Pain Scale: 0    Is this patient confused?: No   Does this patient have a guardian?  No         If yes, is there guardianship documents in the Epic \"Code/ACP\" activity?  N/A         Guardian Notified?  N/A  Thayer - Suicide Severity Rating Scale Completed?  Yes  If yes, what color did the patient score?  White    Patient Report: Initial Complaint: chest pain  Focused Assessment: cardiac monitoring, chest pain sx's  Tests Performed: labs, xray  Abnormal Results: troponin0.046  Treatments provided: monitoring    Family Comments: at bedside    OBS brochure/video discussed/provided to patient/family: Yes              Name of person given brochure if not patient:               Relationship to patient:     ED Medications:   Medications   aspirin (ASA) chewable tablet 162 mg (162 mg Oral Given 5/21/19 1802)       Drips infusing?:  No    For the majority of the shift this patient was Green.   Interventions performed were .    Severe Sepsis OR Septic Shock Diagnosis Present: No    To be done/followed up on inpatient unit:  serial labs, cardiac monitoring    ED NURSE PHONE NUMBER: [phone]         "

## 2019-05-22 NOTE — PLAN OF CARE
PRIMARY DIAGNOSIS: CHEST PAIN  OUTPATIENT/OBSERVATION GOALS TO BE MET BEFORE DISCHARGE:  1. Ruled out acute coronary syndrome (negative or stable Troponin):  No, still in process  2. Pain Status: Pain free.  3. Appropriate provocative testing performed: No  - Stress Test Procedure: NO  - Interpretation of cardiac rhythm per telemetry tech: SB     4. Cleared by Consultants (if applicable):No  5. Return to near baseline physical activity: Yes  Discharge Planner Nurse   Safe discharge environment identified: Yes  Barriers to discharge: Yes       Entered by: Amparo Barillas RN on 5/22/2019 at 4:42 AM  Please review provider order for any additional goals.   Nurse to notify provider when observation goals have been met and patient is ready for discharge.

## 2019-05-22 NOTE — PLAN OF CARE
Pt arrived at 2100 to the floor. A&Ox4. VSS except bradycardic on RA. Denies chest pain or SOB, no c/o tingling in her fingers. After midnight pt will be NPO. Tele is SB. Troponin 1x elevated, blood draws scheduled. MD is aware of elevated trop. Will continue to monitor. Cardiology consult scheduled.

## 2019-07-22 ENCOUNTER — APPOINTMENT (OUTPATIENT)
Age: 59
Setting detail: DERMATOLOGY
End: 2019-08-05

## 2019-07-22 DIAGNOSIS — Z71.89 OTHER SPECIFIED COUNSELING: ICD-10-CM

## 2019-07-22 DIAGNOSIS — L85.3 XEROSIS CUTIS: ICD-10-CM

## 2019-07-22 DIAGNOSIS — L28.0 LICHEN SIMPLEX CHRONICUS: ICD-10-CM

## 2019-07-22 DIAGNOSIS — F42.4 EXCORIATION (SKIN-PICKING) DISORDER: ICD-10-CM

## 2019-07-22 DIAGNOSIS — L82.1 OTHER SEBORRHEIC KERATOSIS: ICD-10-CM

## 2019-07-22 DIAGNOSIS — L81.4 OTHER MELANIN HYPERPIGMENTATION: ICD-10-CM

## 2019-07-22 PROBLEM — S20.409A UNSPECIFIED SUPERFICIAL INJURIES OF UNSPECIFIED BACK WALL OF THORAX, INITIAL ENCOUNTER: Status: ACTIVE | Noted: 2019-07-22

## 2019-07-22 PROCEDURE — 99203 OFFICE O/P NEW LOW 30 MIN: CPT

## 2019-07-22 PROCEDURE — OTHER DIAGNOSIS COMMENT: OTHER

## 2019-07-22 PROCEDURE — OTHER MIPS QUALITY: OTHER

## 2019-07-22 PROCEDURE — OTHER COUNSELING: OTHER

## 2019-07-22 PROCEDURE — OTHER PRESCRIPTION: OTHER

## 2019-07-22 PROCEDURE — OTHER SUNSCREEN TREATMENT REGIMEN: OTHER

## 2019-07-22 RX ORDER — TRIAMCINOLONE ACETONIDE 1 MG/G
APPLY THIN COAT CREAM TOPICAL BID
Qty: 1 | Refills: 2 | Status: ERX | COMMUNITY
Start: 2019-07-22

## 2019-07-22 ASSESSMENT — LOCATION SIMPLE DESCRIPTION DERM
LOCATION SIMPLE: LEFT UPPER BACK
LOCATION SIMPLE: LEFT POSTERIOR UPPER ARM
LOCATION SIMPLE: RIGHT POSTERIOR UPPER ARM
LOCATION SIMPLE: LEFT FOREARM
LOCATION SIMPLE: UPPER BACK
LOCATION SIMPLE: RIGHT UPPER BACK
LOCATION SIMPLE: LEFT ANTERIOR NECK

## 2019-07-22 ASSESSMENT — LOCATION DETAILED DESCRIPTION DERM
LOCATION DETAILED: SUPERIOR THORACIC SPINE
LOCATION DETAILED: LEFT INFERIOR ANTERIOR NECK
LOCATION DETAILED: RIGHT DISTAL POSTERIOR UPPER ARM
LOCATION DETAILED: LEFT SUPERIOR UPPER BACK
LOCATION DETAILED: RIGHT SUPERIOR UPPER BACK
LOCATION DETAILED: LEFT DISTAL DORSAL FOREARM
LOCATION DETAILED: LEFT PROXIMAL POSTERIOR UPPER ARM

## 2019-07-22 ASSESSMENT — LOCATION ZONE DERM
LOCATION ZONE: ARM
LOCATION ZONE: NECK
LOCATION ZONE: TRUNK

## 2019-07-22 NOTE — PROCEDURE: MIPS QUALITY
Detail Level: Detailed
Quality 131: Pain Assessment And Follow-Up: Pain assessment using a standardized tool is documented as negative, no follow-up plan required
Quality 431: Preventive Care And Screening: Unhealthy Alcohol Use - Screening: Patient screened for unhealthy alcohol use using a single question and scores 2 or greater episodes per year and brief intervention occurred
Quality 110: Preventive Care And Screening: Influenza Immunization: Influenza Immunization previously received during influenza season
Quality 130: Documentation Of Current Medications In The Medical Record: Current Medications Documented
Quality 474: Zoster Vaccination Status: Shingrix Vaccination not Administered or Previously Received, Reason not Otherwise Specified
Quality 226: Preventive Care And Screening: Tobacco Use: Screening And Cessation Intervention: Patient screened for tobacco and never smoked

## 2020-10-11 ENCOUNTER — OFFICE VISIT (OUTPATIENT)
Dept: URGENT CARE | Facility: URGENT CARE | Age: 60
End: 2020-10-11
Payer: COMMERCIAL

## 2020-10-11 ENCOUNTER — ANCILLARY PROCEDURE (OUTPATIENT)
Dept: GENERAL RADIOLOGY | Facility: CLINIC | Age: 60
End: 2020-10-11
Attending: PHYSICIAN ASSISTANT
Payer: COMMERCIAL

## 2020-10-11 VITALS
HEIGHT: 66 IN | HEART RATE: 57 BPM | TEMPERATURE: 98.6 F | OXYGEN SATURATION: 100 % | BODY MASS INDEX: 25.55 KG/M2 | SYSTOLIC BLOOD PRESSURE: 137 MMHG | DIASTOLIC BLOOD PRESSURE: 78 MMHG | WEIGHT: 159 LBS

## 2020-10-11 DIAGNOSIS — R07.81 RIB PAIN: Primary | ICD-10-CM

## 2020-10-11 LAB — D DIMER PPP FEU-MCNC: 0.3 UG/ML FEU (ref 0–0.5)

## 2020-10-11 PROCEDURE — 85379 FIBRIN DEGRADATION QUANT: CPT | Performed by: PHYSICIAN ASSISTANT

## 2020-10-11 PROCEDURE — 99203 OFFICE O/P NEW LOW 30 MIN: CPT | Performed by: PHYSICIAN ASSISTANT

## 2020-10-11 PROCEDURE — 36415 COLL VENOUS BLD VENIPUNCTURE: CPT | Performed by: PHYSICIAN ASSISTANT

## 2020-10-11 PROCEDURE — 71101 X-RAY EXAM UNILAT RIBS/CHEST: CPT | Mod: RT | Performed by: RADIOLOGY

## 2020-10-11 ASSESSMENT — MIFFLIN-ST. JEOR: SCORE: 1298.97

## 2020-10-11 NOTE — PROGRESS NOTES
"Patient presents with:  Urgent Care: right rib cage pain/injury from fall one week ago.     Has been running and doing push ups    SUBJECTIVE:  Chief Complaint   Patient presents with     Urgent Care     right rib cage pain/injury from fall one week ago.    Loretta Pizarro is a 60 year old female presents with a chief complaint of  Right anterior rib pain.  Fell on 10/3/20 while running.  Denies any head trauma.     Was running in [place] last week after the injury, even doing push ups.  The pain was dull but really started to hurt yesterday on the car ride back from [place].     Pain with movement and deep breath.     Past Medical History:    The patient does not have any pertinent past medical history.      Past Surgical History:    The patient has no pertinent surgical history.      Family History:  The patient has no pertinent family history.      Past Medical History:   Diagnosis Date     Chest pain 2012    Normal CTA and normal nuclear stress test     Patient Active Problem List   Diagnosis     Chest pain     Social History     Tobacco Use     Smoking status: Never Smoker     Smokeless tobacco: Never Used   Substance Use Topics     Alcohol use: Yes     Comment: 1-2 drinks per day       ROS:  CONSTITUTIONAL:as per HPI  INTEGUMENTARY/SKIN: NEGATIVE for worrisome rashes, moles or lesions  EYES: NEGATIVE for vision changes or irritation  ENT/MOUTH:NEGATIVE  RESP:as per HPI  CV: NEGATIVE for palpitations or peripheral edema  GI: NEGATIVE for nausea, abdominal pain, heartburn, or change in bowel habits  : negative for dysuria, hematuria, decreased urinary stream, erectile dysfunction  MUSCULOSKELETAL:as per HPI  NEURO: NEGATIVE for weakness, dizziness or paresthesias  Review of systems negative except as stated above.    EXAM:   /78   Pulse 57   Temp 98.6  F (37  C) (Temporal)   Ht 1.67 m (5' 5.75\")   Wt 72.1 kg (159 lb)   SpO2 100%   BMI 25.86 kg/m    Gen: healthy,alert,no distress  GENERAL " APPEARANCE: healthy, alert and no distress  CHEST: clear to auscultation Tender on right anterior lateral aspect  CV: regular rate and rhythm  SKIN: no suspicious lesions or rashes  NEURO: Normal strength and tone, sensory exam grossly normal, mentation intact and speech normal    X-RAY was done.  No fractures as per my interpretation during clinic visit.      D-dimer was 0.3      (R07.81) Rib pain/chest pain  (primary encounter diagnosis)  Comment: rib contusion with concern for PE given recent travel and pain with breathing  Plan: XR Ribs & Chest Right G/E 3 Views, D dimer,         Quantitative    D-dimer was wnl.     Avoid push ups for a few weeks  Rest  No lifting for a few weeks    Ibuprofen as needed.       .Greater than 50% of the 25 minutes spent face to face with patient was discussing and reviewing the results of diagnostic tests, discussing risks and benefits of management (treatment) options, instruction for management and follow up and importance of compliance with treatment.        Patient expresses understanding and agreement with the assessment and plan as above.

## 2020-10-12 NOTE — PATIENT INSTRUCTIONS
(R07.81) Rib pain  (primary encounter diagnosis)  Comment: rib contusion  Plan: XR Ribs & Chest Right G/E 3 Views, D dimer,         Quantitative    Avoid push ups for a few weeks  Rest  No lifting for a few weeks    Ibuprofen as needed.

## 2021-01-13 ENCOUNTER — THERAPY VISIT (OUTPATIENT)
Dept: PHYSICAL THERAPY | Facility: CLINIC | Age: 61
End: 2021-01-13
Payer: COMMERCIAL

## 2021-01-13 DIAGNOSIS — M25.552 HIP PAIN, LEFT: Primary | ICD-10-CM

## 2021-01-13 PROCEDURE — 97161 PT EVAL LOW COMPLEX 20 MIN: CPT | Mod: GP | Performed by: PHYSICAL THERAPIST

## 2021-01-13 PROCEDURE — 97110 THERAPEUTIC EXERCISES: CPT | Mod: GP | Performed by: PHYSICAL THERAPIST

## 2021-01-13 NOTE — LETTER
[facility]ERAPY  [id number]  [place] [address]  [phone]    January 15, 2021    Re: Loretta Pizarro   :   1960  MRN:  [id number]   REFERRING PHYSICIAN:   Luis Yap    [facility] [place] [facility]YSICAL [facility]    Date of Initial Evaluation:  2021  Visits:  Rxs Used: 1  Reason for Referral:  Hip pain, left    [facility] Initial Evaluation  Subjective:  Patient reports insidious onset of left lateral hip pain 2020.  She noticed pain while she was running.  She backed off her running after she noticed some soreness that was also reproduced with walking and sitting.  She feels the area is tight after she has been sitting and then loosen after she moves but as she returned to running after her rest she noticed immediate return of symptoms.  Patient does have significant left foot issue with bunion and has previous issues with left hamstring for those had not been bothering her recently.  She is unable to identify any contributing events to the onset she will continue to review any changes that she has made she did note that she has been working from home and therefore has been slightly less active throughout the day.    Objective:  Standing Alignment:    General deviations alignment: Hips are shifted slightly to the left in standing with decreased lumbar lordosis, decreased weightbearing left trunk lean right.  Gait:  Decrease stance time left decreased pushoff left ER left foot position likely related to bunion positioning left  Flexibility/Screens:   Positive screens:  Lumbar ( hip  shifted to the left with decreased lumbar side glide while hips are moving to the right.Moderate loss lumbar extension,  maximal lossMobility LS in standing extension,)  Lower Extremity:  Decreased left lower extremity flexibility:Hip IR's; Hip ER's; Adductors and Hip Flexors  Decreased right lower  extremity flexibility:  Hip Flexors  Spine:  Decreased left spine flexibility:  Quadratus Lumborum  Decreased right spine flexibility:  Quadratus Lumborum  Hip Evaluation  Hip PROM:    Endfeel: right hip ER moderate loss IR moderate loss firm end feel.  Left hip PROM ER minimal loss IR minimal loss with mild reproduction left lateral hip and lumbosacral area pain  Hip Palpation:    Left hip tenderness present at:   IT Band (TF L); PSIS; Adductors and Gluteus Medius  Re: Loretta Dawsonrenataky   :   1960    Left hip tenderness not present at:  Greater Trachanter or Bursa  Functional Testing:    Quad:    Single leg squat:   Left:    Moderate loss of control, femoral IR and excessive anterior knee excursion  Right:   Moderate loss of control and decreased hip/trunk flexion    Assessment/Plan:    Patient is a 60 year old female with left side hip complaints.    Patient has the following significant findings with corresponding treatment plan.                Diagnosis 1: Left hip pain pain -  hot/cold therapy, manual therapy and self management  Decreased ROM/flexibility - manual therapy and therapeutic exercise  Decreased joint mobility - manual therapy and therapeutic exercise  Decreased strength - therapeutic exercise and therapeutic activities  Impaired gait - gait training  Impaired muscle performance - neuro re-education  Decreased function - therapeutic activities  Impaired posture - neuro re-education    Therapy Evaluation Codes:   1) History comprised of:   Personal factors that impact the plan of care:      None.    Comorbidity factors that impact the plan of care are:      None.     Medications impacting care: None.  2) Examination of Body Systems comprised of:   Body structures and functions that impact the plan of care:      Hip, Lumbar spine and Pelvis.   Activity limitations that impact the plan of care are:      Bending, Lifting, Running, Sitting, Sports, Squatting/kneeling, Standing and  Walking.  3) Clinical presentation characteristics are:   Stable/Uncomplicated.  4) Decision-Making    Low complexity using standardized patient assessment instrument and/or measureable assessment of functional outcome.  Cumulative Therapy Evaluation is: Low complexity.    Previous and current functional limitations:  (See Goal Flow Sheet for this information)    Short term and Long term goals: (See Goal Flow Sheet for this information)     Communication ability:  Patient appears to be able to clearly communicate and understand verbal and written communication and follow directions correctly.  Treatment Explanation - The following has been discussed with the patient:   RX ordered/plan of care  Anticipated outcomes  Possible risks and side effects  This patient would benefit from PT intervention to resume normal activities.   Rehab potential is excellent.    Re: Loretta Pizarro   :   1960    Frequency:  1 X week, once daily  Duration:  for 6 weeks  Discharge Plan:  Achieve all LTG.  Independent in home treatment program.  Reach maximal therapeutic benefit.    Thank you for your referral.    INQUIRIES  Therapist: Nicol Joyce, PT  [facility]  [address] [place] [id number]  Phone: [phone]  Fax: [phone]

## 2021-01-14 NOTE — PROGRESS NOTES
[facility] Initial Evaluation  Subjective:  Patient reports insidious onset of left lateral hip pain November 2020.  She noticed pain while she was running.  She backed off her running after she noticed some soreness that was also reproduced with walking and sitting.  She feels the area is tight after she has been sitting and then loosen after she moves but as she returned to running after her rest she noticed immediate return of symptoms.  Patient does have significant left foot issue with bunion and has previous issues with left hamstring for those had not been bothering her recently.  She is unable to identify any contributing events to the onset she will continue to review any changes that she has made she did note that she has been working from home and therefore has been slightly less active throughout the day.                          Objective:  Standing Alignment:                  General deviations alignment: Hips are shifted slightly to the left in standing with decreased lumbar lordosis, decreased weightbearing left trunk lean right.  Gait:  Decrease stance time left decreased pushoff left ER left foot position likely related to bunion positioning left        Flexibility/Screens:   Positive screens:  Lumbar ( hip  shifted to the left with decreased lumbar side glide while hips are moving to the right.Moderate loss lumbar extension,  maximal lossMobility LS in standing extension,)    Lower Extremity:  Decreased left lower extremity flexibility:Hip IR's; Hip ER's; Adductors and Hip Flexors    Decreased right lower extremity flexibility:  Hip Flexors  Spine:  Decreased left spine flexibility:  Quadratus Lumborum    Decreased right spine flexibility:  Quadratus Lumborum                                               Hip Evaluation  Hip PROM:                      Endfeel: right hip ER moderate loss IR moderate loss firm end feel.  Left hip PROM ER minimal loss IR minimal loss with mild  reproduction left lateral hip and lumbosacral area pain          Hip Palpation:    Left hip tenderness present at:   IT Band (TF L); PSIS; Adductors and Gluteus Medius  Left hip tenderness not present at:  Greater Trachanter or Bursa    Functional Testing:          Quad:    Single leg squat:   Left:    Moderate loss of control, femoral IR and excessive anterior knee excursion  Right:   Moderate loss of control and decreased hip/trunk flexion                     General     ROS    Assessment/Plan:    Patient is a 60 year old female with left side hip complaints.    Patient has the following significant findings with corresponding treatment plan.                Diagnosis 1: Left hip pain pain -  hot/cold therapy, manual therapy and self management  Decreased ROM/flexibility - manual therapy and therapeutic exercise  Decreased joint mobility - manual therapy and therapeutic exercise  Decreased strength - therapeutic exercise and therapeutic activities  Impaired gait - gait training  Impaired muscle performance - neuro re-education  Decreased function - therapeutic activities  Impaired posture - neuro re-education    Therapy Evaluation Codes:   1) History comprised of:   Personal factors that impact the plan of care:      None.    Comorbidity factors that impact the plan of care are:      None.     Medications impacting care: None.  2) Examination of Body Systems comprised of:   Body structures and functions that impact the plan of care:      Hip, Lumbar spine and Pelvis.   Activity limitations that impact the plan of care are:      Bending, Lifting, Running, Sitting, Sports, Squatting/kneeling, Standing and Walking.  3) Clinical presentation characteristics are:   Stable/Uncomplicated.  4) Decision-Making    Low complexity using standardized patient assessment instrument and/or measureable assessment of functional outcome.  Cumulative Therapy Evaluation is: Low complexity.    Previous and current functional  limitations:  (See Goal Flow Sheet for this information)    Short term and Long term goals: (See Goal Flow Sheet for this information)     Communication ability:  Patient appears to be able to clearly communicate and understand verbal and written communication and follow directions correctly.  Treatment Explanation - The following has been discussed with the patient:   RX ordered/plan of care  Anticipated outcomes  Possible risks and side effects  This patient would benefit from PT intervention to resume normal activities.   Rehab potential is excellent.    Frequency:  1 X week, once daily  Duration:  for 6 weeks  Discharge Plan:  Achieve all LTG.  Independent in home treatment program.  Reach maximal therapeutic benefit.    Please refer to the daily flowsheet for treatment today, total treatment time and time spent performing 1:1 timed codes.

## 2021-01-18 ASSESSMENT — ACTIVITIES OF DAILY LIVING (ADL)
WALKING_DOWN_STEEP_HILLS: NO DIFFICULTY AT ALL
STANDING_FOR_15_MINUTES: NO DIFFICULTY AT ALL
WALKING_UP_STEEP_HILLS: SLIGHT DIFFICULTY
HOS_ADL_HIGHEST_POTENTIAL_SCORE: 60
WALKING_INITIALLY: SLIGHT DIFFICULTY
HEAVY_WORK: MODERATE DIFFICULTY
WALKING_APPROXIMATELY_10_MINUTES: NO DIFFICULTY AT ALL
TWISTING/PIVOTING_ON_INVOLVED_LEG: SLIGHT DIFFICULTY
HOS_ADL_ITEM_SCORE_TOTAL: 48
RECREATIONAL_ACTIVITIES: MODERATE DIFFICULTY
STEPPING_UP_AND_DOWN_CURBS: NO DIFFICULTY AT ALL
HOW_WOULD_YOU_RATE_YOUR_CURRENT_LEVEL_OF_FUNCTION_DURING_YOUR_USUAL_ACTIVITIES_OF_DAILY_LIVING_FROM_0_TO_100_WITH_100_BEING_YOUR_LEVEL_OF_FUNCTION_PRIOR_TO_YOUR_HIP_PROBLEM_AND_0_BEING_THE_INABILITY_TO_PERFORM_ANY_OF_YOUR_USUAL_DAILY_ACTIVITIES?: 80
WALKING_15_MINUTES_OR_GREATER: SLIGHT DIFFICULTY
ROLLING_OVER_IN_BED: SLIGHT DIFFICULTY
SITTING_FOR_15_MINUTES: SLIGHT DIFFICULTY
HOS_ADL_SCORE(%): 80
PUTTING_ON_SOCKS_AND_SHOES: NO DIFFICULTY AT ALL
GOING_DOWN_1_FLIGHT_OF_STAIRS: NO DIFFICULTY AT ALL
LIGHT_TO_MODERATE_WORK: SLIGHT DIFFICULTY
GOING_UP_1_FLIGHT_OF_STAIRS: SLIGHT DIFFICULTY
HOS_ADL_COUNT: 15
GETTING_INTO_AND_OUT_OF_AN_AVERAGE_CAR: SLIGHT DIFFICULTY

## 2021-01-19 ENCOUNTER — THERAPY VISIT (OUTPATIENT)
Dept: PHYSICAL THERAPY | Facility: CLINIC | Age: 61
End: 2021-01-19
Payer: COMMERCIAL

## 2021-01-19 DIAGNOSIS — M25.552 HIP PAIN, LEFT: Primary | ICD-10-CM

## 2021-01-19 PROCEDURE — 97140 MANUAL THERAPY 1/> REGIONS: CPT | Mod: GP | Performed by: PHYSICAL THERAPIST

## 2021-01-19 PROCEDURE — 97110 THERAPEUTIC EXERCISES: CPT | Mod: GP | Performed by: PHYSICAL THERAPIST

## 2021-02-02 ENCOUNTER — THERAPY VISIT (OUTPATIENT)
Dept: PHYSICAL THERAPY | Facility: CLINIC | Age: 61
End: 2021-02-02
Payer: COMMERCIAL

## 2021-02-02 DIAGNOSIS — M25.552 HIP PAIN, LEFT: Primary | ICD-10-CM

## 2021-02-02 PROCEDURE — 97112 NEUROMUSCULAR REEDUCATION: CPT | Mod: GP | Performed by: PHYSICAL THERAPIST

## 2021-02-02 PROCEDURE — 97110 THERAPEUTIC EXERCISES: CPT | Mod: GP | Performed by: PHYSICAL THERAPIST

## 2021-02-02 PROCEDURE — 97140 MANUAL THERAPY 1/> REGIONS: CPT | Mod: GP | Performed by: PHYSICAL THERAPIST

## 2021-03-20 ENCOUNTER — HEALTH MAINTENANCE LETTER (OUTPATIENT)
Age: 61
End: 2021-03-20

## 2021-10-24 ENCOUNTER — HEALTH MAINTENANCE LETTER (OUTPATIENT)
Age: 61
End: 2021-10-24

## 2021-11-14 NOTE — PROGRESS NOTES
DISCHARGE REPORT    Loretta did not return for further treatment after the last visit on 2/2/21.   Current status is unknown.  Please see information below for last known relevant information.  Patient seen for   visits.    SUBJECTIVE  Subjective changes noted by patient:  Patient reports she has been able to integrate her exercise activities and home exercise program into her regular day and does feel she has improved.  She has no sharp pain into her left hip she does have tightness and fatigue with activities of running up to 30 minutes.  She has no limitation on stairs or using the bike or walking.  She feels comfortable with her home exercise program and will be discharged at this time calling if she has any questions.  .  Current pain level is  .     Previous pain level was   .   Changes in function: (See Goal flowsheet attached for changes in current functional level)  Adverse reaction to treatment or activity: None    OBJECTIVE  Changes noted in objective findings:   Minimal loss left single-leg stance lumbar extension.  Minimal to moderate restriction proximal rectus femoris without reproduction of symptoms.  Good activation transverse abdominis and glutes with cueing.     ASSESSMENT/PLAN  Diagnosis: Left hip pain   Updated problem list and treatment plan:  Patient will continue to utilize HEP for any remaining deficits.   STG/LTGs have been met or progress has been made towards goals:  Please see goal flowsheet for most current information  Assessment of Progress: current status is unknown.    Last current status:     Self Management Plans:  HEP  I have re-evaluated this patient and find that the nature, scope, duration and intensity of the therapy is appropriate for the medical condition of the patient.  Loretta continues to require the following intervention to meet STG and LTG's:  HEP.    Recommendations:  Discharge with current home program.  Patient to follow up with MD as needed.    Please refer to the  daily flowsheet for treatment today, total treatment time and time spent performing 1:1 timed codes.

## 2022-04-10 ENCOUNTER — HEALTH MAINTENANCE LETTER (OUTPATIENT)
Age: 62
End: 2022-04-10

## 2022-10-16 ENCOUNTER — HEALTH MAINTENANCE LETTER (OUTPATIENT)
Age: 62
End: 2022-10-16

## 2023-01-31 ENCOUNTER — MEDICAL CORRESPONDENCE (OUTPATIENT)
Dept: HEALTH INFORMATION MANAGEMENT | Facility: CLINIC | Age: 63
End: 2023-01-31
Payer: COMMERCIAL

## 2023-01-31 DIAGNOSIS — Z82.49 FAMILY HISTORY OF CARDIOVASCULAR DISEASE: ICD-10-CM

## 2023-01-31 DIAGNOSIS — R42 EPISODIC LIGHTHEADEDNESS: ICD-10-CM

## 2023-01-31 DIAGNOSIS — R01.1 SYSTOLIC MURMUR: Primary | ICD-10-CM

## 2023-02-01 ENCOUNTER — HOSPITAL ENCOUNTER (OUTPATIENT)
Dept: MAMMOGRAPHY | Facility: CLINIC | Age: 63
Discharge: HOME | End: 2023-02-01
Attending: FAMILY MEDICINE | Admitting: FAMILY MEDICINE
Payer: COMMERCIAL

## 2023-02-01 DIAGNOSIS — Z12.31 VISIT FOR SCREENING MAMMOGRAM: ICD-10-CM

## 2023-02-01 PROCEDURE — 77067 SCR MAMMO BI INCL CAD: CPT

## 2023-02-13 ENCOUNTER — HOSPITAL ENCOUNTER (OUTPATIENT)
Dept: CARDIOLOGY | Facility: CLINIC | Age: 63
Discharge: HOME | End: 2023-02-13
Attending: FAMILY MEDICINE
Payer: COMMERCIAL

## 2023-02-13 DIAGNOSIS — R01.1 SYSTOLIC MURMUR: ICD-10-CM

## 2023-02-13 DIAGNOSIS — R42 EPISODIC LIGHTHEADEDNESS: ICD-10-CM

## 2023-02-13 DIAGNOSIS — Z82.49 FAMILY HISTORY OF CARDIOVASCULAR DISEASE: ICD-10-CM

## 2023-02-13 LAB — LVEF ECHO: NORMAL

## 2023-02-13 PROCEDURE — 999N000208 ECHOCARDIOGRAM COMPLETE

## 2023-02-13 PROCEDURE — 93306 TTE W/DOPPLER COMPLETE: CPT | Mod: 26 | Performed by: INTERNAL MEDICINE

## 2023-02-13 PROCEDURE — 93248 EXT ECG>7D<15D REV&INTERPJ: CPT | Performed by: INTERNAL MEDICINE

## 2023-02-13 PROCEDURE — 93306 TTE W/DOPPLER COMPLETE: CPT

## 2023-02-13 PROCEDURE — 93246 EXT ECG>7D<15D RECORDING: CPT

## 2023-02-20 ENCOUNTER — HOSPITAL ENCOUNTER (OUTPATIENT)
Dept: CARDIOLOGY | Facility: CLINIC | Age: 63
Discharge: HOME | End: 2023-02-20
Attending: FAMILY MEDICINE | Admitting: FAMILY MEDICINE

## 2023-02-20 DIAGNOSIS — R01.1 SYSTOLIC MURMUR: ICD-10-CM

## 2023-02-20 DIAGNOSIS — R42 EPISODIC LIGHTHEADEDNESS: ICD-10-CM

## 2023-02-20 DIAGNOSIS — Z82.49 FAMILY HISTORY OF CARDIOVASCULAR DISEASE: ICD-10-CM

## 2023-02-20 PROCEDURE — 75571 CT HRT W/O DYE W/CA TEST: CPT

## 2023-02-20 PROCEDURE — 75571 CT HRT W/O DYE W/CA TEST: CPT | Mod: 26 | Performed by: INTERNAL MEDICINE

## 2023-03-02 ENCOUNTER — APPOINTMENT (OUTPATIENT)
Dept: URBAN - METROPOLITAN AREA CLINIC 256 | Age: 63
Setting detail: DERMATOLOGY
End: 2023-03-03

## 2023-03-02 VITALS — WEIGHT: 152 LBS | HEIGHT: 66 IN

## 2023-03-02 DIAGNOSIS — L91.8 OTHER HYPERTROPHIC DISORDERS OF THE SKIN: ICD-10-CM

## 2023-03-02 DIAGNOSIS — L82.1 OTHER SEBORRHEIC KERATOSIS: ICD-10-CM

## 2023-03-02 DIAGNOSIS — D22 MELANOCYTIC NEVI: ICD-10-CM

## 2023-03-02 DIAGNOSIS — Z71.89 OTHER SPECIFIED COUNSELING: ICD-10-CM

## 2023-03-02 DIAGNOSIS — L21.8 OTHER SEBORRHEIC DERMATITIS: ICD-10-CM

## 2023-03-02 DIAGNOSIS — L57.8 OTHER SKIN CHANGES DUE TO CHRONIC EXPOSURE TO NONIONIZING RADIATION: ICD-10-CM

## 2023-03-02 DIAGNOSIS — R20.2 PARESTHESIA OF SKIN: ICD-10-CM

## 2023-03-02 DIAGNOSIS — D18.0 HEMANGIOMA: ICD-10-CM

## 2023-03-02 DIAGNOSIS — L28.0 LICHEN SIMPLEX CHRONICUS: ICD-10-CM

## 2023-03-02 PROBLEM — D22.5 MELANOCYTIC NEVI OF TRUNK: Status: ACTIVE | Noted: 2023-03-02

## 2023-03-02 PROBLEM — D18.01 HEMANGIOMA OF SKIN AND SUBCUTANEOUS TISSUE: Status: ACTIVE | Noted: 2023-03-02

## 2023-03-02 PROCEDURE — OTHER MIPS QUALITY: OTHER

## 2023-03-02 PROCEDURE — 99204 OFFICE O/P NEW MOD 45 MIN: CPT

## 2023-03-02 PROCEDURE — OTHER COUNSELING: OTHER

## 2023-03-02 PROCEDURE — OTHER PRESCRIPTION: OTHER

## 2023-03-02 PROCEDURE — OTHER PRESCRIPTION MEDICATION MANAGEMENT: OTHER

## 2023-03-02 RX ORDER — FLUOCINONIDE 0.5 MG/G
OINTMENT TOPICAL BID
Qty: 60 | Refills: 1 | Status: ERX | COMMUNITY
Start: 2023-03-02

## 2023-03-02 ASSESSMENT — LOCATION SIMPLE DESCRIPTION DERM
LOCATION SIMPLE: RIGHT FOREARM
LOCATION SIMPLE: LEFT FOREARM
LOCATION SIMPLE: RIGHT SUPERIOR EYELID
LOCATION SIMPLE: RIGHT THIGH
LOCATION SIMPLE: LEFT UPPER BACK
LOCATION SIMPLE: LEFT THIGH
LOCATION SIMPLE: POSTERIOR SCALP
LOCATION SIMPLE: RIGHT UPPER BACK

## 2023-03-02 ASSESSMENT — LOCATION DETAILED DESCRIPTION DERM
LOCATION DETAILED: LEFT MID-UPPER BACK
LOCATION DETAILED: RIGHT LATERAL SUPERIOR EYELID
LOCATION DETAILED: RIGHT SUPERIOR UPPER BACK
LOCATION DETAILED: LEFT SUPERIOR UPPER BACK
LOCATION DETAILED: LEFT MEDIAL UPPER BACK
LOCATION DETAILED: MID-OCCIPITAL SCALP
LOCATION DETAILED: LEFT INFERIOR UPPER BACK
LOCATION DETAILED: RIGHT ANTERIOR DISTAL THIGH
LOCATION DETAILED: RIGHT PROXIMAL DORSAL FOREARM
LOCATION DETAILED: LEFT PROXIMAL DORSAL FOREARM
LOCATION DETAILED: LEFT ANTERIOR DISTAL THIGH

## 2023-03-02 ASSESSMENT — LOCATION ZONE DERM
LOCATION ZONE: ARM
LOCATION ZONE: LEG
LOCATION ZONE: TRUNK
LOCATION ZONE: EYELID
LOCATION ZONE: SCALP

## 2023-03-02 NOTE — HPI: OTHER
Condition:: Skin tag
Please Describe Your Condition:: Bothersome skin tag located on the right eyelid. She would like to discuss removal of the area.

## 2023-03-02 NOTE — HPI: RASH
Is This A New Presentation, Or A Follow-Up?: Rash
Additional History: Patient states she was prescribed a steroid for this rash by her primary doctor about a month ago but states it cleared up quickly with this medication and only did it a few times.
Additional History: The patient describes this as an Itch that comes and goes that has been present for years

## 2023-03-02 NOTE — PROCEDURE: PRESCRIPTION MEDICATION MANAGEMENT
Detail Level: Zone
Initiate Treatment: Fluocinonide ointment - Apply a thin coat to affected areas twice daily for 14 days.
Render In Strict Bullet Format?: No

## 2023-03-02 NOTE — PROCEDURE: COUNSELING
Detail Level: Detailed
Detail Level: Generalized
Detail Level: Simple
Patient Specific Counseling (Will Not Stick From Patient To Patient): Patient will use Fluocinonide for 14 days and then dc and use aquaphor on affected areas for another 2 weeks before returning to clinic.
Detail Level: Zone

## 2023-05-15 ENCOUNTER — APPOINTMENT (OUTPATIENT)
Dept: GENERAL RADIOLOGY | Facility: CLINIC | Age: 63
End: 2023-05-15
Attending: EMERGENCY MEDICINE
Payer: COMMERCIAL

## 2023-05-15 ENCOUNTER — HOSPITAL ENCOUNTER (EMERGENCY)
Facility: CLINIC | Age: 63
Discharge: HOME | End: 2023-05-15
Attending: EMERGENCY MEDICINE | Admitting: EMERGENCY MEDICINE
Payer: COMMERCIAL

## 2023-05-15 VITALS
OXYGEN SATURATION: 98 % | TEMPERATURE: 97.6 F | DIASTOLIC BLOOD PRESSURE: 82 MMHG | HEIGHT: 66 IN | WEIGHT: 154 LBS | HEART RATE: 57 BPM | BODY MASS INDEX: 24.75 KG/M2 | RESPIRATION RATE: 14 BRPM | SYSTOLIC BLOOD PRESSURE: 135 MMHG

## 2023-05-15 DIAGNOSIS — R00.2 PALPITATIONS: ICD-10-CM

## 2023-05-15 LAB
ALBUMIN SERPL BCG-MCNC: 4.5 G/DL (ref 3.5–5.2)
ALP SERPL-CCNC: 77 U/L (ref 35–104)
ALT SERPL W P-5'-P-CCNC: 30 U/L (ref 10–35)
ANION GAP SERPL CALCULATED.3IONS-SCNC: 12 MMOL/L (ref 7–15)
AST SERPL W P-5'-P-CCNC: 33 U/L (ref 10–35)
ATRIAL RATE - MUSE: 61 BPM
BASOPHILS # BLD AUTO: 0 10E3/UL (ref 0–0.2)
BASOPHILS NFR BLD AUTO: 0 %
BILIRUB DIRECT SERPL-MCNC: <0.2 MG/DL (ref 0–0.3)
BILIRUB SERPL-MCNC: 0.3 MG/DL
BUN SERPL-MCNC: 11.8 MG/DL (ref 8–23)
CALCIUM SERPL-MCNC: 9.3 MG/DL (ref 8.8–10.2)
CHLORIDE SERPL-SCNC: 101 MMOL/L (ref 98–107)
CREAT SERPL-MCNC: 0.9 MG/DL (ref 0.51–0.95)
DEPRECATED HCO3 PLAS-SCNC: 25 MMOL/L (ref 22–29)
DIASTOLIC BLOOD PRESSURE - MUSE: NORMAL MMHG
EOSINOPHIL # BLD AUTO: 0.1 10E3/UL (ref 0–0.7)
EOSINOPHIL NFR BLD AUTO: 1 %
ERYTHROCYTE [DISTWIDTH] IN BLOOD BY AUTOMATED COUNT: 12.3 % (ref 10–15)
GFR SERPL CREATININE-BSD FRML MDRD: 71 ML/MIN/1.73M2
GLUCOSE SERPL-MCNC: 102 MG/DL (ref 70–99)
HCT VFR BLD AUTO: 42.3 % (ref 35–47)
HGB BLD-MCNC: 13.8 G/DL (ref 11.7–15.7)
HOLD SPECIMEN: NORMAL
IMM GRANULOCYTES # BLD: 0 10E3/UL
IMM GRANULOCYTES NFR BLD: 0 %
INTERPRETATION ECG - MUSE: NORMAL
LYMPHOCYTES # BLD AUTO: 1.4 10E3/UL (ref 0.8–5.3)
LYMPHOCYTES NFR BLD AUTO: 26 %
MCH RBC QN AUTO: 29.7 PG (ref 26.5–33)
MCHC RBC AUTO-ENTMCNC: 32.6 G/DL (ref 31.5–36.5)
MCV RBC AUTO: 91 FL (ref 78–100)
MONOCYTES # BLD AUTO: 0.4 10E3/UL (ref 0–1.3)
MONOCYTES NFR BLD AUTO: 7 %
NEUTROPHILS # BLD AUTO: 3.5 10E3/UL (ref 1.6–8.3)
NEUTROPHILS NFR BLD AUTO: 66 %
NRBC # BLD AUTO: 0 10E3/UL
NRBC BLD AUTO-RTO: 0 /100
P AXIS - MUSE: 65 DEGREES
PLATELET # BLD AUTO: 218 10E3/UL (ref 150–450)
POTASSIUM SERPL-SCNC: 4.2 MMOL/L (ref 3.4–5.3)
PR INTERVAL - MUSE: 118 MS
PROT SERPL-MCNC: 6.7 G/DL (ref 6.4–8.3)
QRS DURATION - MUSE: 100 MS
QT - MUSE: 436 MS
QTC - MUSE: 438 MS
R AXIS - MUSE: 71 DEGREES
RBC # BLD AUTO: 4.64 10E6/UL (ref 3.8–5.2)
SODIUM SERPL-SCNC: 138 MMOL/L (ref 136–145)
SYSTOLIC BLOOD PRESSURE - MUSE: NORMAL MMHG
T AXIS - MUSE: 14 DEGREES
TROPONIN T SERPL HS-MCNC: 10 NG/L
TROPONIN T SERPL HS-MCNC: 14 NG/L
VENTRICULAR RATE- MUSE: 61 BPM
WBC # BLD AUTO: 5.4 10E3/UL (ref 4–11)

## 2023-05-15 PROCEDURE — 71046 X-RAY EXAM CHEST 2 VIEWS: CPT

## 2023-05-15 PROCEDURE — 85025 COMPLETE CBC W/AUTO DIFF WBC: CPT | Performed by: EMERGENCY MEDICINE

## 2023-05-15 PROCEDURE — 99285 EMERGENCY DEPT VISIT HI MDM: CPT | Mod: 25

## 2023-05-15 PROCEDURE — 93005 ELECTROCARDIOGRAM TRACING: CPT

## 2023-05-15 PROCEDURE — 84484 ASSAY OF TROPONIN QUANT: CPT | Performed by: EMERGENCY MEDICINE

## 2023-05-15 PROCEDURE — 82248 BILIRUBIN DIRECT: CPT | Performed by: EMERGENCY MEDICINE

## 2023-05-15 PROCEDURE — 80053 COMPREHEN METABOLIC PANEL: CPT | Performed by: EMERGENCY MEDICINE

## 2023-05-15 PROCEDURE — 84484 ASSAY OF TROPONIN QUANT: CPT | Mod: 91 | Performed by: EMERGENCY MEDICINE

## 2023-05-15 PROCEDURE — 36415 COLL VENOUS BLD VENIPUNCTURE: CPT | Performed by: EMERGENCY MEDICINE

## 2023-05-15 ASSESSMENT — ACTIVITIES OF DAILY LIVING (ADL)
ADLS_ACUITY_SCORE: 35
ADLS_ACUITY_SCORE: 35

## 2023-05-15 NOTE — ED PROVIDER NOTES
"  History     Chief Complaint:  Numbness       HPI   Loretta Pizarro is a 63 year old female with a history of chest pain and abnormal EKG who presents with sudden onset of left arm and finger numbness that she noticed this morning after she felt her heart was \"beating hard or racing\" when she woke up at 3 AM.  She presents to the ED at 6:30 AM.  The patient describes the sensation in her chest as a pressure.  She describes it as \"subtle\".  She denies nausea, shortness of breath, fever, vomiting.  The patient had similar symptoms approximately 4 years ago.  She was cleared of cardiac issues at the time.  The patient has a family history of stroke, pacemaker placement, and hypertension.  The patient is being treated for high cholesterol.  The patient exercises daily.  She has not had symptoms with exercising.  Today, she states the symptoms do not worsen with exertion.  The patient states that recently she is under a great deal of stress.         Independent Historian:   Spouse/Partner - They report Corroborates the patient's HPI    Review of External Notes:   Shar Alicea, PT  Physical Therapist  Encounter Date:  2/2/2021  Subjective changes noted by patient:  Patient reports she has been able to integrate her exercise activities and home exercise program into her regular day and does feel she has improved.  She has no sharp pain into her left hip she does have tightness and fatigue with activities of running up to 30 minutes.  She has no limitation on stairs or using the bike or walking.  She feels comfortable with her home exercise program and will be discharged at this time calling if she has any questions    ROS:  See the HPI, otherwise the rest of the ROS is negative.    Allergies:  No Known Allergies     Medications:    Rosuvastatin    Past Medical History:    The patient denies any pertinent past medical history.    Family History:    Hypertension - mother  Type II diabetes - mother  Vascular " "dementia - father    Social History:  Patient is unaccompanied in the ED.  PCP: [facility], [facility]     Physical Exam     Patient Vitals for the past 24 hrs:   BP Temp Temp src Pulse Resp SpO2 Height Weight   05/15/23 0630 (!) 181/76 97.6  F (36.4  C) Temporal 65 18 100 % 1.676 m (5' 6\") 69.9 kg (154 lb)        Physical Exam  General: Alert, No distress. Nontoxic appearance  Head: No signs of trauma.   Mouth/Throat: Oropharynx moist.   Eyes: Conjunctivae are normal. Pupils are equal..   Neck: Normal range of motion.    CV: Appears well perfused.  Regular rate and rhythm no murmurs  Resp:No respiratory distress.  Clear to auscultation bilaterally, no wheezes no crackles.  ABD: Soft nontender  MSK: Normal range of motion. No obvious deformity.   Neuro: The patient is alert and interactive. QUAN. Speech normal. GCS 15  Skin: No lesion or sign of trauma noted.   Psych: normal mood and affect. behavior is normal.       Emergency Department Course   ECG:  ECG results from 05/15/23   EKG 12 lead     Value    Systolic Blood Pressure     Diastolic Blood Pressure     Ventricular Rate 61    Atrial Rate 61    VT Interval 118    QRS Duration 100        QTc 438    P Axis 65    R AXIS 71    T Axis 14    Interpretation ECG      Sinus rhythm  T wave abnormality, consider anterolateral ischemia  Abnormal ECG  When compared with ECG of 21-MAY-2019 17:33,  Inverted T waves have replaced nonspecific T wave abnormality in Lateral leads         Imaging:  XR Chest 2 Views   Final Result   IMPRESSION: Negative chest.      AZAEL CHRISTENSEN MD            SYSTEM ID:  [id number]         Report per radiology    Laboratory:  Labs Ordered and Resulted from Time of ED Arrival to Time of ED Departure   BASIC METABOLIC PANEL - Abnormal       Result Value    Sodium 138      Potassium 4.2      Chloride 101      Carbon Dioxide (CO2) 25      Anion Gap 12      Urea Nitrogen 11.8      Creatinine 0.90      Calcium 9.3      Glucose 102 " (*)     GFR Estimate 71     TROPONIN T, HIGH SENSITIVITY - Normal    Troponin T, High Sensitivity 14     HEPATIC FUNCTION PANEL - Normal    Protein Total 6.7      Albumin 4.5      Bilirubin Total 0.3      Alkaline Phosphatase 77      AST 33      ALT 30      Bilirubin Direct <0.20     TROPONIN T, HIGH SENSITIVITY - Normal    Troponin T, High Sensitivity 10     CBC WITH PLATELETS AND DIFFERENTIAL    WBC Count 5.4      RBC Count 4.64      Hemoglobin 13.8      Hematocrit 42.3      MCV 91      MCH 29.7      MCHC 32.6      RDW 12.3      Platelet Count 218      % Neutrophils 66      % Lymphocytes 26      % Monocytes 7      % Eosinophils 1      % Basophils 0      % Immature Granulocytes 0      NRBCs per 100 WBC 0      Absolute Neutrophils 3.5      Absolute Lymphocytes 1.4      Absolute Monocytes 0.4      Absolute Eosinophils 0.1      Absolute Basophils 0.0      Absolute Immature Granulocytes 0.0      Absolute NRBCs 0.0          Procedures   none    Emergency Department Course & Assessments:       Interventions:  Medications - No data to display       Independent Interpretation (X-rays, CTs, rhythm strip):  Chest x-ray no pneumothorax    Consultations/Discussion of Management or Tests:  None        Social Determinants of Health affecting care:   None    Disposition:  The patient was discharged to home.     Impression & Plan    CMS Diagnoses: None      Medical Decision Making:  Loretta Pizarro is a 63 year old female who presents for evaluation of palpitations.  Initial ECG shows normal sinus rhythm and no acute ischemic changes except for inverted T waves but these are not new compared to her previous EKG.  A broad differential diagnosis was considered including SVT, Atrial fibrillation, ventricular arrhythmia, thyroid disease, acute electrolyte abnormality,  drugs/medications, caffeine intake or other stimulants, medication side effect, anemia, heart disease, PE, etc.  The workup and exam here in ED would indicate  that supportive outpatient management is indicated.  I doubt PE as patient is not hypoxic, tachycardic, or short of breath.  Doubt acute coronary syndrome given negative troponin x2, symptoms and exam.  Will have them follow up with primary care doctor.          Diagnosis:    ICD-10-CM    1. Palpitations  R00.2            Discharge Medications:  New Prescriptions    No medications on file        Scribe Disclosure:  I, Ruth Ann Ortiz, am serving as a scribe at 6:53 AM on 5/15/2023 to document services personally performed by Alcides Carrington MD based on my observations and the provider's statements to me.     5/15/2023   Alcides Carrington MD Joing, Todd Roger, MD  05/16/23 1030       Alcides Carrington MD  05/16/23 1031

## 2023-05-15 NOTE — ED TRIAGE NOTES
States was awakened with heart racing and and numbness  In L jace fingers     Triage Assessment     Row Name 05/15/23 0627       Triage Assessment (Adult)    Airway WDL WDL       Respiratory WDL    Respiratory WDL WDL       Skin Circulation/Temperature WDL    Skin Circulation/Temperature WDL WDL       Cardiac WDL    Cardiac WDL X;chest pain       Chest Pain Assessment    Chest Pain Location arm, left;anterior chest, left       Peripheral/Neurovascular WDL    Peripheral Neurovascular WDL X  numbness fingers L hand       Cognitive/Neuro/Behavioral WDL    Cognitive/Neuro/Behavioral WDL WDL

## 2023-06-01 ENCOUNTER — HEALTH MAINTENANCE LETTER (OUTPATIENT)
Age: 63
End: 2023-06-01

## 2024-06-01 ENCOUNTER — HEALTH MAINTENANCE LETTER (OUTPATIENT)
Age: 64
End: 2024-06-01

## 2025-04-12 ENCOUNTER — HEALTH MAINTENANCE LETTER (OUTPATIENT)
Age: 65
End: 2025-04-12

## 2025-06-14 ENCOUNTER — HEALTH MAINTENANCE LETTER (OUTPATIENT)
Age: 65
End: 2025-06-14